# Patient Record
Sex: FEMALE | Race: WHITE | NOT HISPANIC OR LATINO | Employment: FULL TIME | URBAN - METROPOLITAN AREA
[De-identification: names, ages, dates, MRNs, and addresses within clinical notes are randomized per-mention and may not be internally consistent; named-entity substitution may affect disease eponyms.]

---

## 2017-03-16 ENCOUNTER — TELEPHONE (OUTPATIENT)
Dept: NEUROLOGY | Facility: CLINIC | Age: 22
End: 2017-03-16

## 2017-03-16 NOTE — TELEPHONE ENCOUNTER
----- Message from Oren Cole sent at 3/16/2017  1:57 PM CDT -----  Contact: Self [phone]  Caller is calling to schedule a NP to consult for MS and small vessel disease. pls call

## 2017-03-16 NOTE — TELEPHONE ENCOUNTER
Pt does not have MS. MRI of the [place] was abnormal. Advised she would need to see General Neurologist. Transferred patient to General Neurology to schedule.

## 2017-04-21 ENCOUNTER — OFFICE VISIT (OUTPATIENT)
Dept: NEUROLOGY | Facility: CLINIC | Age: 22
End: 2017-04-21
Payer: COMMERCIAL

## 2017-04-21 VITALS
BODY MASS INDEX: 24.06 KG/M2 | WEIGHT: 144.38 LBS | DIASTOLIC BLOOD PRESSURE: 71 MMHG | SYSTOLIC BLOOD PRESSURE: 101 MMHG | HEART RATE: 65 BPM | HEIGHT: 65 IN

## 2017-04-21 DIAGNOSIS — E22.9 PITUITARY MICROADENOMA WITH HYPERPROLACTINEMIA: Chronic | ICD-10-CM

## 2017-04-21 DIAGNOSIS — R93.89 ABNORMAL MRI: Primary | ICD-10-CM

## 2017-04-21 DIAGNOSIS — D35.2 PITUITARY MICROADENOMA WITH HYPERPROLACTINEMIA: Chronic | ICD-10-CM

## 2017-04-21 DIAGNOSIS — F41.9 ANXIETY: ICD-10-CM

## 2017-04-21 DIAGNOSIS — L65.9 HAIR LOSS: ICD-10-CM

## 2017-04-21 DIAGNOSIS — R41.3 MEMORY LOSS: ICD-10-CM

## 2017-04-21 DIAGNOSIS — R51.9 CHRONIC NONINTRACTABLE HEADACHE, UNSPECIFIED HEADACHE TYPE: ICD-10-CM

## 2017-04-21 DIAGNOSIS — H91.91 HEARING LOSS OF RIGHT EAR, UNSPECIFIED HEARING LOSS TYPE: Chronic | ICD-10-CM

## 2017-04-21 DIAGNOSIS — G89.29 CHRONIC NONINTRACTABLE HEADACHE, UNSPECIFIED HEADACHE TYPE: ICD-10-CM

## 2017-04-21 DIAGNOSIS — H73.90 ABNORMAL TYMPANIC MEMBRANE, UNSPECIFIED LATERALITY: ICD-10-CM

## 2017-04-21 PROBLEM — D68.2 FACTOR V DEFICIENCY: Chronic | Status: ACTIVE | Noted: 2017-04-21

## 2017-04-21 PROCEDURE — 99999 PR PBB SHADOW E&M-EST. PATIENT-LVL V: CPT | Mod: PBBFAC,,, | Performed by: PSYCHIATRY & NEUROLOGY

## 2017-04-21 PROCEDURE — 99203 OFFICE O/P NEW LOW 30 MIN: CPT | Mod: S$GLB,,, | Performed by: PSYCHIATRY & NEUROLOGY

## 2017-04-21 PROCEDURE — 1160F RVW MEDS BY RX/DR IN RCRD: CPT | Mod: S$GLB,,, | Performed by: PSYCHIATRY & NEUROLOGY

## 2017-04-21 NOTE — PROGRESS NOTES
Name: Jolly Whyte  MRN: [id number]   CSN: [id number]      Date: 04/21/2017    Referring physician:  Orlando Gastelum MD  [address]  [address]  [facility] [address]    Chief Complaint / Interval History: Abnormal MRI     History of Present Illness (HPI):  Accompanied by mom. Pt and mother state that she has an abnormal MRI - pt started loosing her hair, found to have a pituitary tumor. Pt has headaches and has had hearing loss in one ear also has memory trouble.     Headaches started about two years ago. Pressure like in nature - located in the temples and behind the eyes 8/10 on the pain scale, occur 4-5 times per month, last about an hour or two - she takes excedrin migraine which helps. NO nasusea or vomiting with them.  Do not wake her up at night. No warning sign she will get a  Headaches. Aunt has migraines. Gets photophobia and phonophobia. Has to stop her activities when they come on.     Hearing loss in one ear - also started about two years ago, right ear, happens all the time. Has not seen an ear doctor yet. No senstaion of ear fullness. No dizzziness. No ear pain.     Memorry trouble - cant remember things any more, harder to study for tests also in the last two years. Forgets the names of things sometimes, has some word finding difficulty. Pt drives, completes all ADLs independently     Hair loss also started about two years ago - sees Dr. [facility]elum [facility]. Falling out on top of her head. Sees scalp in her shower -she doesn't want to brush her hair.           Nonmotor/Premotor ROS:  Fevers, chills, weight loss - no  Hyposmia (HENT)?No  RBD/sleep issues (Constitutional)?No  Depression/anxiety (Psychiatric)?Yes - anxiety   Fatigue (Constitutional)?No  Constipation (GI)?No  Urinary issues ()?No  Sexual dysfunction ()?N/A  Orthostasis (Cardiovascular)?No  Leg swelling (Cardiovascular)? No  Falls (Musculoskeletal)?Yes - states she is clumsy  - runs into walls  "  Cognitive impairment (Neurologic)?Yes  Psychoses (Psychiatric)?No  Pain/Paresthesia (Neurologic)?No  Visual changes (Eyes)?No  Moles / skin changes (Skin)?No  Stridor / SOB (Pulm)?No  Bruising (Heme)?Yes    Past Medical History: The patient  has a past medical history of Chronic nonintractable headache (4/21/2017); Factor V deficiency (4/21/2017); and Pituitary microadenoma with hyperprolactinemia (5/7/2015).    Social History: The patient  lives with her mother and stepfather. Works as , occasional Et[employer] use.     Family History: Their family history includes Chiari malformation in her mother; Migraines in her maternal aunt; Myasthenia gravis in her mother.    Allergies: Review of patient's allergies indicates not on file.     Meds:   No current outpatient prescriptions on file prior to visit.     No current facility-administered medications on file prior to visit.      PRN Exedrin migraine     Exam:  /71 (BP Location: Left arm, Patient Position: Sitting, BP Method: Automatic)  Pulse 65  Ht 5' 5" (1.651 m)  Wt 65.5 kg (144 lb 6.4 oz)  BMI 24.03 kg/m2    Constitutional  Well-developed, well-nourished, appears stated age   Ophthalmoscopic  No papilledema with no hemorrhages or exudates bilaterally   HEENT  NC/AT, MMM, normal pharynx    Neurological    * Mental status      - Orientation  Oriented to person, place, time, and situation     - Memory   Intact recent and remote     - Attention/concentration  Attentive, vigilant during exam     - Language  Naming & repetition intact     - Fund of knowledge  Aware of current events     - Executive  Well-organized thoughts     - Other     * Cranial nerves       - CN II  PERRL, visual fields full to confrontation     - CN III, IV, VI  Extraocular movements full, normal pursuits and saccades     - CN V  Sensation V1 - V3 intact     - CN VII  Face strong and symmetric bilaterally     - CN VIII  Hearing intact bilaterally     - CN IX, X  Palate raises " midline and symmetric     - CN XI  SCM and trapezius 5/5 bilaterally     - CN XII  Tongue midline   * Motor  Muscle bulk normal, strength 5/5 throughout   * Sensory   Intact to temperature and vibration throughout   * Coordination  No dysmetria with finger-to-nose or heel-to-shin   * Gait  See below.   * Deep tendon reflexes  2+ and symmetric throughout   * Specialized movement exam  No hypophonic speech.    No facial masking.   No cogwheel rigidity.     No bradykinesia.   No tremor with rest, posture, kinesis, or intention.    No other dystonia, chorea, athetosis, myoclonus, or tics.     Normal-based gait.   No shortened stride length.   No abnormal arm swing.     Negative rhomberg      Laboratory/Radiological:    Prolacin level high in 2015: 52 ng/ml   - Results:No results found for any previous visit.    - Independent review of images: From outside discs, reviewed 3 MRIs one from 2015, 2016 and 2017.           Scattered punctate areas of hyperintensity on T2 and T2 flair - interpreted as areas enhanced perivascular space. Not concerning for demyelinating lesions. Not significantly progressed in size or number from scans dating back to 2015, 2016 which were also reviewed by me in office today.       Medical Decision Making:    Problem List Items Addressed This Visit        Neuro    Pituitary microadenoma with hyperprolactinemia (Chronic)    Overview     Stable size on MRIs of neck. Followed by outside endocrinologist but patient requests new endocrinologist [place] [facility] [place]          Current Assessment & Plan     [facility]          Relevant Orders    Ambulatory consult to Endocrinology    Chronic nonintractable headache    Overview     Migrainous, though no nausea or vomiting         Current Assessment & Plan     Offered preventative medication but pt prefers to stay on PRN Excedrin migraine for now         Abnormal tympanic membrane    Overview     May be calcifications, but patient also  complaining of right sided hearing loss         Current Assessment & Plan     - ENT and audiology referral          Relevant Orders    Ambulatory consult to ENT    Anxiety    Overview     Pt at times tearful during today's exam.         Current Assessment & Plan     - Counseled that she should see either a psychiatrist or a psychologist and she prefers seeing a psychiatrist to discuss options for medications.   - referred to psychiatry          Relevant Orders    Ambulatory consult to Psychiatry    Memory loss    Overview     Subjective.          Current Assessment & Plan     Offered neuropsychologic testing today which patient declined.     - Continue to monitor             Other    Hearing loss of right ear (Chronic)    Current Assessment & Plan     - ENT and audiology referral          Relevant Orders    Ambulatory consult to ENT    Abnormal MRI - Primary    Overview     Pituitary macroadenoma and pineal cyst - do not feel that punctate spots represent pathologic finding          Current Assessment & Plan     - reassured patient and her mother that findings are not presently concerning for multiple sclerosis.   - No further MRI's unless she has new or change in her neurologic symptoms as she has already had 3 MRIs in the last 2.5 years none with significant progression of punctate spots in question which I feel represent perivascular spaces           Hair loss    Overview     Followed by outside endocrinologist. Requests referral to [facility] endocrinology today          Relevant Orders    Ambulatory consult to Endocrinology            Return in about 6 months (around 10/21/2017).

## 2017-04-21 NOTE — MR AVS SNAPSHOT
"    [facility] - Neurology  [address]  [facility] [address]  Phone: [phone]  Fax: [phone]                  Jolly BOYER Pato   2017 8:00 AM   Office Visit    Description:  Female : 1995   Provider:  Victorino Pollock MD   Department:  [facility] - Neurology           Reason for Visit     Consult           Diagnoses this Visit        Comments    Abnormal MRI    -  Primary     Factor V deficiency         Chronic nonintractable headache, unspecified headache type         Pituitary microadenoma with hyperprolactinemia         Hearing loss of right ear, unspecified hearing loss type         Abnormal tympanic membrane, unspecified laterality         Hair loss         Anxiety                To Do List           Goals (5 Years of Data)     None      [facility]     [facility] On Call Nurse Care Line -  Assistance  Unless otherwise directed by your provider, please contact [facility], our nurse care line that is available for  assistance.     Registered nurses in the [facility] On Call Center provide: appointment scheduling, clinical advisement, health education, and other advisory services.  Call: [phone] (toll free)               Medications           Message regarding Medications     Verify the changes and/or additions to your medication regime listed below are the same as discussed with your clinician today.  If any of these changes or additions are incorrect, please notify your healthcare provider.             Verify that the below list of medications is an accurate representation of the medications you are currently taking.  If none reported, the list may be blank. If incorrect, please contact your healthcare provider. Carry this list with you in case of emergency.                Clinical Reference Information           Your Vitals Were     BP Pulse Height Weight BMI    101/71 (BP Location: Left arm, Patient Position: Sitting, BP Method: Automatic) 65 5' 5" (1.651 m) " 65.5 kg (144 lb 6.4 oz) 24.03 kg/m2      Blood Pressure          Most Recent Value    BP  101/71      Allergies as of 4/21/2017     No Known Allergies      Immunizations Administered on Date of Encounter - 4/21/2017     None      Orders Placed During Today's Visit      Normal Orders This Visit    Ambulatory consult to Endocrinology     Ambulatory consult to ENT     Ambulatory consult to Psychiatry       Instructions    1) Let me know if you would be interested in neuropsychologic testing in the future    2) Call ENT to get your initial consultation scheduled: [phone]    3) Someone from endocrinology and psychiatry will be giving you a call    4) Please come back and see us in 6 months to a year.         Language Assistance Services     ATTENTION: Language assistance services are available, free of charge. Please call [phone].      ATENCIÓN: Si valariela susi, tiene a vila disposición servicios gratuitos de asistencia lingüística. Llame al [phone].     CHÚ Ý: N?u b?n nói Ti?ng Vi?t, có các d?ch v? h? tr? ngôn ng? mi?n phí dành cho b?n. G?i s? [phone].         [facility] complies with applicable Federal civil rights laws and does not discriminate on the basis of race, color, national origin, age, disability, or sex.

## 2017-04-21 NOTE — ASSESSMENT & PLAN NOTE
- Counseled that she should see either a psychiatrist or a psychologist and she prefers seeing a psychiatrist to discuss options for medications.   - referred to psychiatry

## 2017-04-21 NOTE — PATIENT INSTRUCTIONS
1) Let me know if you would be interested in neuropsychologic testing in the future    2) Call ENT to get your initial consultation scheduled: [phone]    3) Someone from endocrinology and psychiatry will be giving you a call    4) Please come back and see us in 6 months to a year.

## 2017-04-21 NOTE — ASSESSMENT & PLAN NOTE
- reassured patient and her mother that findings are not presently concerning for multiple sclerosis.   - No further MRI's unless she has new or change in her neurologic symptoms as she has already had 3 MRIs in the last 2.5 years none with significant progression of punctate spots in question which I feel represent perivascular spaces

## 2017-04-21 NOTE — LETTER
April 21, 2017      Orlando Gastelum MD  [address]  [address]  [facility]  [facility] [address]           [facility]  [address]  [place] [address]  Phone: [phone]  Fax: [phone]          Patient: Jolly Whyte   MR Number: [id number]   YOB: 1995   Date of Visit: 4/21/2017       Dear Dr. Orlando Gastelum:    Thank you for referring Jolly Whyte to me for evaluation. Attached you will find relevant portions of my assessment and plan of care.    If you have questions, please do not hesitate to call me. I look forward to following Jolly Whyte along with you.    Sincerely,    Victorino Pollock MD    Enclosure  CC:  No Recipients    If you would like to receive this communication electronically, please contact [email or web address] or [phone] to request more information on [employer] Link access.    For providers and/or their staff who would like to refer a patient to [facility], please contact us through our one-stop-shop provider referral line, [facility], at [phone].    If you feel you have received this communication in error or would no longer like to receive these types of communications, please e-mail [email or web address]

## 2017-05-24 ENCOUNTER — CLINICAL SUPPORT (OUTPATIENT)
Dept: AUDIOLOGY | Facility: CLINIC | Age: 22
End: 2017-05-24
Payer: COMMERCIAL

## 2017-05-24 ENCOUNTER — INITIAL CONSULT (OUTPATIENT)
Dept: OTOLARYNGOLOGY | Facility: CLINIC | Age: 22
End: 2017-05-24
Payer: COMMERCIAL

## 2017-05-24 VITALS — HEIGHT: 65 IN | BODY MASS INDEX: 23.52 KG/M2 | WEIGHT: 141.13 LBS

## 2017-05-24 DIAGNOSIS — H80.91 OTOSCLEROSIS, RIGHT: Primary | ICD-10-CM

## 2017-05-24 DIAGNOSIS — H90.71 MIXED CONDUCTIVE AND SENSORINEURAL HEARING LOSS OF RIGHT EAR WITH UNRESTRICTED HEARING OF LEFT EAR: Primary | ICD-10-CM

## 2017-05-24 PROCEDURE — 92567 TYMPANOMETRY: CPT | Mod: S$GLB,,, | Performed by: AUDIOLOGIST

## 2017-05-24 PROCEDURE — 92557 COMPREHENSIVE HEARING TEST: CPT | Mod: S$GLB,,, | Performed by: AUDIOLOGIST

## 2017-05-24 PROCEDURE — 99999 PR PBB SHADOW E&M-EST. PATIENT-LVL I: CPT | Mod: PBBFAC,,,

## 2017-05-24 PROCEDURE — 99999 PR PBB SHADOW E&M-EST. PATIENT-LVL II: CPT | Mod: PBBFAC,,, | Performed by: OTOLARYNGOLOGY

## 2017-05-24 PROCEDURE — 99203 OFFICE O/P NEW LOW 30 MIN: CPT | Mod: S$GLB,,, | Performed by: OTOLARYNGOLOGY

## 2017-05-24 PROCEDURE — 1160F RVW MEDS BY RX/DR IN RCRD: CPT | Mod: S$GLB,,, | Performed by: OTOLARYNGOLOGY

## 2017-05-24 NOTE — PROGRESS NOTES
Subjective:       Patient ID: Jolly Wyhte is a 21 y.o. female.    Chief Complaint: Hearing Loss    20 yo F with 2 year hx of decreased hearing AD. No fam hx, no hx of ear infections or surgeries. No pain, vertigo. Min tinnitus on right      Hearing Loss:   Chronicity:  New  Onset:  More than 1 year ago  Progression since onset:  Gradually worsening  Frequency:  Constantly  Pain scale:  0/10  Severity:  Mild  Hearing loss characteristics:  Stable, mild and muffled   Associated symptoms: tinnitus.  No dizziness, no vertigo, no ear congestion, no ear pain, no fever, no headaches, no imbalance, no otalgia, no otorrhea and no facial weakness.  Aggravated by:  Nothing  Treatments tried:  NothingNo dizziness.    Review of Systems   Constitutional: Negative.  Negative for fever.   HENT: Positive for hearing loss and tinnitus. Negative for ear discharge and ear pain.    Eyes: Negative for visual disturbance.   Respiratory: Negative for shortness of breath.    Cardiovascular: Negative for chest pain.   Gastrointestinal: Negative.    Endocrine: Negative.    Genitourinary: Negative.    Musculoskeletal: Negative.    Skin: Negative.    Allergic/Immunologic: Negative.    Neurological: Negative for dizziness, vertigo and headaches.   Hematological: Negative.    Psychiatric/Behavioral: Negative.        Past Medical History:   Diagnosis Date    Chronic nonintractable headache 4/21/2017    Factor V deficiency 4/21/2017    Pituitary microadenoma with hyperprolactinemia 5/7/2015       No past surgical history on file.    Objective:      Physical Exam   Constitutional: She is oriented to person, place, and time. Vital signs are normal. She appears well-developed and well-nourished.  Non-toxic appearance. She does not have a sickly appearance. No distress.   HENT:   Head: Normocephalic and atraumatic.   Right Ear: External ear and ear canal normal. No tenderness. Tympanic membrane is not injected. Decreased hearing is noted.   Left  Ear: External ear and ear canal normal. No tenderness. Tympanic membrane is not injected. No decreased hearing is noted.   Ears:    Nose: No mucosal edema, rhinorrhea or septal deviation.   Eyes: EOM and lids are normal. Pupils are equal, round, and reactive to light.   Neck: Normal range of motion. No thyroid mass present.   Cardiovascular: Normal rate, regular rhythm and normal heart sounds.    Pulmonary/Chest: Effort normal and breath sounds normal.   Lymphadenopathy:     She has no cervical adenopathy.   Neurological: She is alert and oriented to person, place, and time.   Skin: Skin is warm and dry.   Nursing note and vitals reviewed.              Rinne neg AD with 512 hz.    Douglas to DARRELL      Assessment:       1. Otosclerosis, right             Vs SSC abnl.  Plan:       Jolly was seen today for hearing loss.    Diagnoses and all orders for this visit:    Otosclerosis, right  -     Ambulatory referral to Audiology    Discussed Right small fenestra stapedotomy with patient. Also reviewed all other options including observation and amplification. Risks, complications, benefits and goals reviewed. This includes: failure to improve, total loss of hearing, tinnitus, dizziness, chorda symptoms, infection, bleeding, need for revision and other potential complications. Will proceed at the patients convinience a general outpatient procedure after appropriate clearances.    Patient to see Audiology for hearing aid evaluation.    Needs pre op CT of T bones.

## 2017-05-24 NOTE — LETTER
May 24, 2017      Victorino Pollock MD  [address]  [facility] [address]           [facility] - Otorhinolaryngology  [address]  [facility] [address]  Phone: [phone]  Fax: [phone]          Patient: Jolly Whyte   MR Number: [id number]   YOB: 1995   Date of Visit: 5/24/2017       Dear Dr. Victorino Pollock:    Thank you for referring Jolly Whyte to me for evaluation. Attached you will find relevant portions of my assessment and plan of care.    If you have questions, please do not hesitate to call me. I look forward to following Jolly Whyte along with you.    Sincerely,    Angel Pierre MD    Enclosure  CC:  No Recipients    If you would like to receive this communication electronically, please contact [email or web address] or [phone] to request more information on [employer] Link access.    For providers and/or their staff who would like to refer a patient to [facility], please contact us through our one-stop-shop provider referral line, [facility], at [phone].    If you feel you have received this communication in error or would no longer like to receive these types of communications, please e-mail [email or web address]

## 2017-08-07 ENCOUNTER — OFFICE VISIT (OUTPATIENT)
Dept: ENDOCRINOLOGY | Facility: CLINIC | Age: 22
End: 2017-08-07
Payer: COMMERCIAL

## 2017-08-07 VITALS
HEIGHT: 65 IN | SYSTOLIC BLOOD PRESSURE: 114 MMHG | RESPIRATION RATE: 16 BRPM | BODY MASS INDEX: 22.7 KG/M2 | HEART RATE: 60 BPM | WEIGHT: 136.25 LBS | DIASTOLIC BLOOD PRESSURE: 73 MMHG

## 2017-08-07 DIAGNOSIS — E22.9 PITUITARY MICROADENOMA WITH HYPERPROLACTINEMIA: Primary | Chronic | ICD-10-CM

## 2017-08-07 DIAGNOSIS — D35.2 PITUITARY MICROADENOMA WITH HYPERPROLACTINEMIA: Primary | Chronic | ICD-10-CM

## 2017-08-07 DIAGNOSIS — L65.9 HAIR LOSS: ICD-10-CM

## 2017-08-07 DIAGNOSIS — R93.89 ABNORMAL MRI: ICD-10-CM

## 2017-08-07 DIAGNOSIS — E22.1 HYPERPROLACTINEMIA: ICD-10-CM

## 2017-08-07 PROCEDURE — 99204 OFFICE O/P NEW MOD 45 MIN: CPT | Mod: S$GLB,,, | Performed by: INTERNAL MEDICINE

## 2017-08-07 PROCEDURE — 3008F BODY MASS INDEX DOCD: CPT | Mod: S$GLB,,, | Performed by: INTERNAL MEDICINE

## 2017-08-07 PROCEDURE — 99999 PR PBB SHADOW E&M-EST. PATIENT-LVL III: CPT | Mod: PBBFAC,,, | Performed by: INTERNAL MEDICINE

## 2017-08-07 NOTE — LETTER
August 7, 2017      Victorino Pollock MD  [address]  [facility] [address]           [facility]  [address]  [facility] [address]  Phone: [phone]  Fax: [phone]          Patient: Jolly Whyte   MR Number: [id number]   YOB: 1995   Date of Visit: 8/7/2017       Dear Dr. Victorino Pollock:    Thank you for referring Jolly Whyte to me for evaluation. Attached you will find relevant portions of my assessment and plan of care.    If you have questions, please do not hesitate to call me. I look forward to following Jolly Whyte along with you.    Sincerely,    Heriberto Isbell MD    Enclosure  CC:  No Recipients    If you would like to receive this communication electronically, please contact [email or web address] or [phone] to request more information on [employer] Link access.    For providers and/or their staff who would like to refer a patient to [facility], please contact us through our one-stop-shop provider referral line, [facility], at [phone].    If you feel you have received this communication in error or would no longer like to receive these types of communications, please e-mail [email or web address]

## 2017-08-07 NOTE — PROGRESS NOTES
Subjective:      Patient ID: Jolly Whyte is a 22 y.o. female.    Chief Complaint:  Pituitary microadenoma      History of Present Illness    MsJunior Whyte is here with her mother   She was having hair loss in 2015 found to have hyperprolactinemia at the time for work up for hair loss   Menarche at age 10   Regular menses     She is not pregnant per her   No galactorrhea or breast tenderness   no h/o kidney or thyroid disease     No h/o eating disorder    She eats ~ 1400 rell/day     Never had chest trauma      PSH:  H/o knee surgery and tonsillectomy     Review of Systems   Eyes: Negative for visual disturbance.   Cardiovascular: Negative for chest pain and palpitations.   Gastrointestinal: Negative for constipation, diarrhea and nausea.   Genitourinary: Negative for menstrual problem.   Neurological: Positive for headaches.   Psychiatric/Behavioral: Negative for sleep disturbance. The patient is nervous/anxious.        Objective:   Physical Exam   Constitutional: She appears well-developed.   HENT:   Right Ear: External ear normal.   Left Ear: External ear normal.   Nose: Nose normal.   Neck: No tracheal deviation present. No thyromegaly present.   Cardiovascular: Normal rate.    No murmur heard.  Pulmonary/Chest: Effort normal and breath sounds normal.       Abdominal: Soft. There is no tenderness. No hernia.   Musculoskeletal: She exhibits no edema.   Neurological: She displays normal reflexes. No cranial nerve deficit.   Skin: No rash noted.          Psychiatric: She has a normal mood and affect. Judgment normal.   Vitals reviewed.      Lab Review:   Outside sent for scan     MRI report 4 x 2 mm pituitary microadenoma     Prolactin 52 from 2015     Assessment:     1. Pituitary microadenoma with hyperprolactinemia  TSH    Comprehensive metabolic panel    Prolactin    TSH    Prolactin    Insulin-like growth factor    Insulin-like growth factor    Comprehensive metabolic panel    Prolactin    TSH     Insulin-like growth factor    Insulin-like growth factor   2. Abnormal MRI  TSH    Comprehensive metabolic panel    Prolactin    TSH    Prolactin    Insulin-like growth factor    Insulin-like growth factor    Comprehensive metabolic panel    Prolactin    TSH    Insulin-like growth factor    Insulin-like growth factor   3. Hair loss  TSH    Comprehensive metabolic panel    Prolactin    TSH    Prolactin    Insulin-like growth factor    Insulin-like growth factor    Comprehensive metabolic panel    Prolactin    TSH    Insulin-like growth factor    Insulin-like growth factor   4. Hyperprolactinemia  TSH    Comprehensive metabolic panel    Prolactin    TSH    Prolactin    Insulin-like growth factor    Insulin-like growth factor    Comprehensive metabolic panel    Prolactin    TSH    Insulin-like growth factor    Insulin-like growth factor        # hyperprolactinemia with microadenoma     Reviewed indications for therapy including macroadenoma, galactorrhea, infertility and hypogonadism which can negatively impact bone health.    No indication for treatment     Prolactin, IGF-1, TSH, CMP today      Hand out provided       # hair loss   Work up already done   Check tSH            Plan:     Follow up:  Prolactin, IGF-1, TSH, CMP  today

## 2017-08-10 LAB
ALBUMIN SERPL-MCNC: 4.9 G/DL (ref 3.6–5.1)
ALBUMIN/GLOB SERPL: 2.1 (CALC) (ref 1–2.5)
ALP SERPL-CCNC: 57 U/L (ref 33–115)
ALT SERPL-CCNC: 13 U/L (ref 6–29)
AST SERPL-CCNC: 16 U/L (ref 10–30)
BILIRUB SERPL-MCNC: 0.4 MG/DL (ref 0.2–1.2)
BUN SERPL-MCNC: 10 MG/DL (ref 7–25)
BUN/CREAT SERPL: NORMAL (CALC) (ref 6–22)
CALCIUM SERPL-MCNC: 9.6 MG/DL (ref 8.6–10.2)
CHLORIDE SERPL-SCNC: 106 MMOL/L (ref 98–110)
CO2 SERPL-SCNC: 27 MMOL/L (ref 20–31)
CREAT SERPL-MCNC: 0.64 MG/DL (ref 0.5–1.1)
GFR SERPL CREATININE-BSD FRML MDRD: 128 ML/MIN/1.73M2
GLOBULIN SER CALC-MCNC: 2.3 G/DL (CALC) (ref 1.9–3.7)
GLUCOSE SERPL-MCNC: 76 MG/DL (ref 65–99)
IGF-I SERPL-MCNC: 162 NG/ML (ref 83–456)
IGF-I Z-SCORE SERPL: -1 SD
POTASSIUM SERPL-SCNC: 4.5 MMOL/L (ref 3.5–5.3)
PROLACTIN SERPL-MCNC: 11.6 NG/ML
PROT SERPL-MCNC: 7.2 G/DL (ref 6.1–8.1)
SODIUM SERPL-SCNC: 140 MMOL/L (ref 135–146)
TSH SERPL-ACNC: 2.31 MIU/L

## 2017-12-01 ENCOUNTER — OFFICE VISIT (OUTPATIENT)
Dept: URGENT CARE | Facility: CLINIC | Age: 22
End: 2017-12-01
Payer: COMMERCIAL

## 2017-12-01 VITALS
WEIGHT: 136 LBS | BODY MASS INDEX: 22.66 KG/M2 | RESPIRATION RATE: 18 BRPM | HEART RATE: 61 BPM | DIASTOLIC BLOOD PRESSURE: 70 MMHG | HEIGHT: 65 IN | SYSTOLIC BLOOD PRESSURE: 107 MMHG | OXYGEN SATURATION: 99 % | TEMPERATURE: 97 F

## 2017-12-01 DIAGNOSIS — J10.1 INFLUENZA A: Primary | ICD-10-CM

## 2017-12-01 DIAGNOSIS — R11.0 NAUSEA: ICD-10-CM

## 2017-12-01 LAB
CTP QC/QA: YES
FLUAV AG NPH QL: POSITIVE
FLUBV AG NPH QL: NEGATIVE

## 2017-12-01 PROCEDURE — 99213 OFFICE O/P EST LOW 20 MIN: CPT | Mod: 25,S$GLB,, | Performed by: INTERNAL MEDICINE

## 2017-12-01 PROCEDURE — 87804 INFLUENZA ASSAY W/OPTIC: CPT | Mod: QW,S$GLB,, | Performed by: INTERNAL MEDICINE

## 2017-12-01 RX ORDER — OSELTAMIVIR PHOSPHATE 75 MG/1
75 CAPSULE ORAL 2 TIMES DAILY
Qty: 10 CAPSULE | Refills: 0 | Status: SHIPPED | OUTPATIENT
Start: 2017-12-01 | End: 2017-12-06

## 2017-12-01 NOTE — PROGRESS NOTES
"Subjective:       Patient ID: Jolly Whyte is a 22 y.o. female.    Vitals:  height is 5' 5" (1.651 m) and weight is 61.7 kg (136 lb). Her oral temperature is 97.4 °F (36.3 °C). Her blood pressure is 107/70 and her pulse is 61. Her respiration is 18 and oxygen saturation is 99%.     Chief Complaint: Fever    Pt states for the last 2 days she has had chills and fever of 101 and today the nausea started.      Nausea   This is a new problem. The current episode started in the past 7 days. The problem occurs constantly. The problem has been gradually worsening. Associated symptoms include chills, a fever, nausea and vomiting. Pertinent negatives include no abdominal pain, chest pain, headaches, rash or sore throat. Exacerbated by: always feeling nauseated  Treatments tried: dayquil. The treatment provided no relief.   Patient is a pre-school assistant.  She has 2 day history of fever, cough and nausea.    Review of Systems   Constitution: Positive for chills and fever.   HENT: Negative for sore throat.    Eyes: Negative for blurred vision.   Cardiovascular: Negative for chest pain.   Respiratory: Negative for shortness of breath.    Skin: Negative for rash.   Musculoskeletal: Negative for back pain and joint pain.   Gastrointestinal: Positive for diarrhea, nausea and vomiting. Negative for abdominal pain.   Neurological: Negative for headaches.   Psychiatric/Behavioral: The patient is not nervous/anxious.        Objective:      Physical Exam   Constitutional: She appears well-developed and well-nourished. No distress.   HENT:   Mouth/Throat: Oropharynx is clear and moist. No oropharyngeal exudate.   Neck: Neck supple.   Cardiovascular: Normal rate and regular rhythm.    Pulmonary/Chest: Effort normal and breath sounds normal. No respiratory distress. She has no wheezes. She has no rales.   Lymphadenopathy:     She has no cervical adenopathy.   Nursing note and vitals reviewed.    Flu NP test positive  Assessment:     " 1.  Influenza A    Plan:       1.  Tamiflu 75 mg BID  2.  Advil and fluids

## 2017-12-02 NOTE — PATIENT INSTRUCTIONS

## 2018-08-06 ENCOUNTER — OFFICE VISIT (OUTPATIENT)
Dept: INTERNAL MEDICINE | Facility: CLINIC | Age: 23
End: 2018-08-06
Payer: COMMERCIAL

## 2018-08-06 VITALS
OXYGEN SATURATION: 99 % | DIASTOLIC BLOOD PRESSURE: 74 MMHG | BODY MASS INDEX: 22.56 KG/M2 | WEIGHT: 135.38 LBS | SYSTOLIC BLOOD PRESSURE: 102 MMHG | HEIGHT: 65 IN | HEART RATE: 60 BPM

## 2018-08-06 DIAGNOSIS — Z11.1 SCREENING-PULMONARY TB: ICD-10-CM

## 2018-08-06 DIAGNOSIS — Z01.84 IMMUNITY STATUS TESTING: ICD-10-CM

## 2018-08-06 DIAGNOSIS — Z13.220 SCREENING CHOLESTEROL LEVEL: ICD-10-CM

## 2018-08-06 DIAGNOSIS — Z01.89 ROUTINE LAB DRAW: ICD-10-CM

## 2018-08-06 DIAGNOSIS — Z00.00 ENCOUNTER FOR HEALTH MAINTENANCE EXAMINATION IN ADULT: Primary | ICD-10-CM

## 2018-08-06 PROCEDURE — 99999 PR PBB SHADOW E&M-EST. PATIENT-LVL IV: CPT | Mod: PBBFAC,,, | Performed by: NURSE PRACTITIONER

## 2018-08-06 PROCEDURE — 99385 PREV VISIT NEW AGE 18-39: CPT | Mod: S$GLB,,, | Performed by: NURSE PRACTITIONER

## 2018-08-06 PROCEDURE — 86580 TB INTRADERMAL TEST: CPT | Mod: S$GLB,,, | Performed by: NURSE PRACTITIONER

## 2018-08-06 NOTE — PROGRESS NOTES
Subjective:       Patient ID: Jolly Whyte is a 23 y.o. female.    Chief Complaint: Annual Exam    Pt new to me, presents for annual with labs for Zhu. Previous PCP was her pediatrician.    Was seen last year by Endocrine for Pituitary microadenoma with hyperprolactinemia. Last seen 8-7-2017 by Endocrine.       Review of Systems   Constitutional: Negative for activity change, appetite change and unexpected weight change.   HENT: Negative for dental problem and hearing loss.    Eyes: Negative for visual disturbance.   Respiratory: Negative for apnea, cough, chest tightness and shortness of breath.    Cardiovascular: Negative for chest pain, palpitations and leg swelling.   Gastrointestinal: Negative for abdominal distention, abdominal pain, anal bleeding, blood in stool, constipation, diarrhea, nausea, rectal pain and vomiting.   Endocrine: Negative for cold intolerance, heat intolerance, polydipsia, polyphagia and polyuria.   Genitourinary: Negative for difficulty urinating, hematuria, menstrual problem, pelvic pain and vaginal pain.   Musculoskeletal: Negative for arthralgias.   Skin: Negative for color change.   Allergic/Immunologic: Negative for environmental allergies, food allergies and immunocompromised state.   Neurological: Negative for dizziness, speech difficulty and weakness.   Hematological: Negative for adenopathy. Does not bruise/bleed easily.   Psychiatric/Behavioral: Negative for agitation, behavioral problems, sleep disturbance and suicidal ideas.       Review of patient's allergies indicates:  No Known Allergies    No current outpatient prescriptions on file.     Patient Active Problem List   Diagnosis    Factor V deficiency    Chronic nonintractable headache    Pituitary microadenoma with hyperprolactinemia    Hearing loss of right ear    Abnormal tympanic membrane    Abnormal MRI    Hair loss    Anxiety    Memory loss     Past Medical History:   Diagnosis Date    Chronic  "nonintractable headache 4/21/2017    Factor V deficiency 4/21/2017    Pituitary microadenoma with hyperprolactinemia 5/7/2015     Past Surgical History:   Procedure Laterality Date    left knee debridement Left     age 7     Social History     Social History    Marital status: Single     Spouse name: N/A    Number of children: N/A    Years of education: N/A     Occupational History          Social History Main Topics    Smoking status: Never Smoker    Smokeless tobacco: Current User    Alcohol use No    Drug use: No    Sexual activity: Yes     Other Topics Concern    None     Social History Narrative    None     Family History   Problem Relation Age of Onset    Chiari malformation Mother     Myasthenia gravis Mother     Migraines Maternal Aunt        Objective:       Vitals:    08/06/18 0800   BP: 102/74   Pulse: 60   SpO2: 99%   Weight: 61.4 kg (135 lb 5.8 oz)   Height: 5' 5" (1.651 m)   PainSc: 0-No pain     Body mass index is 22.53 kg/m².    Physical Exam   Constitutional: She is oriented to person, place, and time. Vital signs are normal. She appears well-developed and well-nourished.   HENT:   Head: Normocephalic.   Right Ear: Hearing, tympanic membrane, external ear and ear canal normal.   Left Ear: Hearing, tympanic membrane, external ear and ear canal normal.   Eyes: Conjunctivae, EOM and lids are normal. Pupils are equal, round, and reactive to light. Lids are everted and swept, no foreign bodies found.   Neck: Trachea normal, normal range of motion and full passive range of motion without pain. Neck supple. No JVD present. Carotid bruit is not present.   Cardiovascular: Normal rate, regular rhythm, S1 normal, S2 normal, normal heart sounds, intact distal pulses and normal pulses.    Pulmonary/Chest: Effort normal and breath sounds normal.   Abdominal: Soft. Normal appearance and bowel sounds are normal. There is no hepatosplenomegaly.   Musculoskeletal: Normal range of " motion.   Neurological: She is alert and oriented to person, place, and time. She has normal strength and normal reflexes.   Skin: Skin is warm, dry and intact. Capillary refill takes less than 2 seconds.   Psychiatric: She has a normal mood and affect. Her speech is normal and behavior is normal. Judgment and thought content normal. Cognition and memory are normal.   Vitals reviewed.      Assessment:       1. Encounter for health maintenance examination in adult    2. Screening cholesterol level    3. Routine lab draw    4. Immunity status testing    5. Screening-pulmonary TB    6. BMI 22.0-22.9, adult        Plan:     Jolly was seen today for annual exam.    Diagnoses and all orders for this visit:    Encounter for health maintenance examination in adult  Annual wellness exam completed.    All medications, histories, and concerns reviewed, reconciled, and addressed.    Appropriate Screenings per pt's sex and age have been reviewed and discussed with pt.    BMI reviewed.    Screening cholesterol level  -     Lipid panel; Future    Routine lab draw  -     CBC auto differential; Future  -     Comprehensive metabolic panel; Future  -     Lipid panel; Future  -     CBC auto differential  -     Comprehensive metabolic panel  Immunity status testing  -     Varicella zoster antibody, IgG; Future  -     Mumps, IgG Screen; Future  -     Rubeola antibody IgG; Future  -     Hepatitis B surface antibody; Future        -     Rubella antibody IgG; Future  Screening-pulmonary TB  -     POCT TB Skin Test Read    BMI 22.0-22.9, adult  BMI reviewed    Will need to get Influenza vaccine at retail pharmacy who has available now    PPD test today, RTC in 48-72 hrs for reading    Titers drawn today, will call with results

## 2018-08-06 NOTE — PATIENT INSTRUCTIONS
Will need to get Influenza vaccine at retail pharmacy who has available now    PPD test today, RTC in 48-72 hrs for reading    Titers drawn today, will call with results

## 2018-08-08 ENCOUNTER — PATIENT MESSAGE (OUTPATIENT)
Dept: INTERNAL MEDICINE | Facility: CLINIC | Age: 23
End: 2018-08-08

## 2018-08-08 ENCOUNTER — CLINICAL SUPPORT (OUTPATIENT)
Dept: INTERNAL MEDICINE | Facility: CLINIC | Age: 23
End: 2018-08-08
Payer: COMMERCIAL

## 2018-08-08 ENCOUNTER — TELEPHONE (OUTPATIENT)
Dept: INTERNAL MEDICINE | Facility: CLINIC | Age: 23
End: 2018-08-08

## 2018-08-08 DIAGNOSIS — Z23 NEED FOR HEPATITIS B VACCINATION: Primary | ICD-10-CM

## 2018-08-08 DIAGNOSIS — Z01.84 IMMUNITY STATUS TESTING: Primary | ICD-10-CM

## 2018-08-08 NOTE — TELEPHONE ENCOUNTER
----- Message from Carson Ortiz sent at 8/8/2018 10:25 AM CDT -----  Contact: [phone]  Patient requesting a call from the office in regards to her visit she just had . Please advise  Thanks

## 2018-08-08 NOTE — TELEPHONE ENCOUNTER
Spoke with patient she is having trouble getting results from [facility]. Ms Jackson called and requested results to be faxed to us.

## 2018-08-09 LAB
ALBUMIN SERPL-MCNC: 4.4 G/DL (ref 3.6–5.1)
ALBUMIN/GLOB SERPL: 2.1 (CALC) (ref 1–2.5)
ALP SERPL-CCNC: 57 U/L (ref 33–115)
ALT SERPL-CCNC: 15 U/L (ref 6–29)
AST SERPL-CCNC: 14 U/L (ref 10–30)
BASOPHILS # BLD AUTO: 37 CELLS/UL (ref 0–200)
BASOPHILS NFR BLD AUTO: 0.6 %
BILIRUB SERPL-MCNC: 0.5 MG/DL (ref 0.2–1.2)
BUN SERPL-MCNC: 11 MG/DL (ref 7–25)
BUN/CREAT SERPL: NORMAL (CALC) (ref 6–22)
CALCIUM SERPL-MCNC: 9.6 MG/DL (ref 8.6–10.2)
CHLORIDE SERPL-SCNC: 105 MMOL/L (ref 98–110)
CHOLEST SERPL-MCNC: 184 MG/DL
CHOLEST/HDLC SERPL: 3.6 (CALC)
CLIENT CONTACT:: NORMAL
CO2 SERPL-SCNC: 27 MMOL/L (ref 20–32)
COMMENT: NORMAL
CREAT SERPL-MCNC: 0.59 MG/DL (ref 0.5–1.1)
EOSINOPHIL # BLD AUTO: 93 CELLS/UL (ref 15–500)
EOSINOPHIL NFR BLD AUTO: 1.5 %
ERYTHROCYTE [DISTWIDTH] IN BLOOD BY AUTOMATED COUNT: 12.3 % (ref 11–15)
GFR SERPL CREATININE-BSD FRML MDRD: 131 ML/MIN/1.73M2
GLOBULIN SER CALC-MCNC: 2.1 G/DL (CALC) (ref 1.9–3.7)
GLUCOSE SERPL-MCNC: 85 MG/DL (ref 65–139)
HBV SURFACE AB SER QL IA: NORMAL
HCT VFR BLD AUTO: 40.6 % (ref 35–45)
HDLC SERPL-MCNC: 51 MG/DL
HGB BLD-MCNC: 13.3 G/DL (ref 11.7–15.5)
LDLC SERPL CALC-MCNC: 117 MG/DL (CALC)
LYMPHOCYTES # BLD AUTO: 1823 CELLS/UL (ref 850–3900)
LYMPHOCYTES NFR BLD AUTO: 29.4 %
Lab: NORMAL
MCH RBC QN AUTO: 30.6 PG (ref 27–33)
MCHC RBC AUTO-ENTMCNC: 32.8 G/DL (ref 32–36)
MCV RBC AUTO: 93.5 FL (ref 80–100)
MEV IGG SER IA-ACNC: 163 AU/ML
MONOCYTES # BLD AUTO: 391 CELLS/UL (ref 200–950)
MONOCYTES NFR BLD AUTO: 6.3 %
MUV IGG SER IA-ACNC: 54.7 AU/ML
NEUTROPHILS # BLD AUTO: 3856 CELLS/UL (ref 1500–7800)
NEUTROPHILS NFR BLD AUTO: 62.2 %
NONHDLC SERPL-MCNC: 133 MG/DL (CALC)
PLATELET # BLD AUTO: 271 THOUSAND/UL (ref 140–400)
PMV BLD REES-ECKER: 11.5 FL (ref 7.5–12.5)
POTASSIUM SERPL-SCNC: 3.8 MMOL/L (ref 3.5–5.3)
PROT SERPL-MCNC: 6.5 G/DL (ref 6.1–8.1)
RBC # BLD AUTO: 4.34 MILLION/UL (ref 3.8–5.1)
REF LAB TEST NAME: NORMAL
REF LAB TEST: NORMAL
RUBV IGG SERPL IA-ACNC: 4.9 INDEX
SODIUM SERPL-SCNC: 140 MMOL/L (ref 135–146)
TRIGL SERPL-MCNC: 70 MG/DL
VZV IGG SER IA-ACNC: 1234 INDEX
WBC # BLD AUTO: 6.2 THOUSAND/UL (ref 3.8–10.8)

## 2018-08-10 ENCOUNTER — CLINICAL SUPPORT (OUTPATIENT)
Dept: INFECTIOUS DISEASES | Facility: CLINIC | Age: 23
End: 2018-08-10
Payer: COMMERCIAL

## 2018-08-10 PROCEDURE — 90746 HEPB VACCINE 3 DOSE ADULT IM: CPT | Mod: S$GLB,,, | Performed by: INTERNAL MEDICINE

## 2018-08-10 PROCEDURE — 90471 IMMUNIZATION ADMIN: CPT | Mod: S$GLB,,, | Performed by: INTERNAL MEDICINE

## 2018-08-10 NOTE — PROGRESS NOTES
Pt received the Hepatitis B vaccination. Pt tolerated the injection well. Pt left the unit in NAD. Immunization report printed per pt request.

## 2018-10-16 ENCOUNTER — TELEPHONE (OUTPATIENT)
Dept: INTERNAL MEDICINE | Facility: CLINIC | Age: 23
End: 2018-10-16

## 2018-10-16 DIAGNOSIS — Z23 NEED FOR HEPATITIS B VACCINATION: Primary | ICD-10-CM

## 2018-10-16 NOTE — TELEPHONE ENCOUNTER
----- Message from Lotus Linder sent at 10/16/2018  1:26 PM CDT -----  Contact: Pt Mobile [phone]  Patient would like to put in an order to have a Hepatitis B shot.

## 2018-10-24 ENCOUNTER — CLINICAL SUPPORT (OUTPATIENT)
Dept: INFECTIOUS DISEASES | Facility: CLINIC | Age: 23
End: 2018-10-24
Payer: COMMERCIAL

## 2018-10-24 PROCEDURE — 90746 HEPB VACCINE 3 DOSE ADULT IM: CPT | Mod: S$GLB,,, | Performed by: INTERNAL MEDICINE

## 2018-10-24 PROCEDURE — 90471 IMMUNIZATION ADMIN: CPT | Mod: S$GLB,,, | Performed by: INTERNAL MEDICINE

## 2019-02-12 ENCOUNTER — CLINICAL SUPPORT (OUTPATIENT)
Dept: INFECTIOUS DISEASES | Facility: CLINIC | Age: 24
End: 2019-02-12
Payer: COMMERCIAL

## 2019-02-12 PROCEDURE — 90471 IMMUNIZATION ADMIN: CPT | Mod: S$GLB,,, | Performed by: INTERNAL MEDICINE

## 2019-02-12 PROCEDURE — 90471 HEPATITIS B VACCINE ADULT IM: ICD-10-PCS | Mod: S$GLB,,, | Performed by: INTERNAL MEDICINE

## 2019-02-12 PROCEDURE — 90746 HEPB VACCINE 3 DOSE ADULT IM: CPT | Mod: S$GLB,,, | Performed by: INTERNAL MEDICINE

## 2019-02-12 PROCEDURE — 90746 HEPATITIS B VACCINE ADULT IM: ICD-10-PCS | Mod: S$GLB,,, | Performed by: INTERNAL MEDICINE

## 2019-09-05 ENCOUNTER — CLINICAL SUPPORT (OUTPATIENT)
Dept: URGENT CARE | Facility: CLINIC | Age: 24
End: 2019-09-05
Payer: COMMERCIAL

## 2019-09-05 DIAGNOSIS — Z00.00 PHYSICAL EXAM: Primary | ICD-10-CM

## 2019-09-05 PROCEDURE — 86580 POCT TB SKIN TEST: ICD-10-PCS | Mod: S$GLB,,, | Performed by: NURSE PRACTITIONER

## 2019-09-05 PROCEDURE — 86580 TB INTRADERMAL TEST: CPT | Mod: S$GLB,,, | Performed by: NURSE PRACTITIONER

## 2019-09-08 LAB
TB INDURATION - 48 HR READ: NORMAL MM
TB INDURATION - 72 HR READ: 0 MM
TB SKIN TEST - 48 HR READ: NORMAL
TB SKIN TEST - 72 HR READ: NEGATIVE

## 2019-11-21 ENCOUNTER — OFFICE VISIT (OUTPATIENT)
Dept: INTERNAL MEDICINE | Facility: CLINIC | Age: 24
End: 2019-11-21
Payer: COMMERCIAL

## 2019-11-21 VITALS
WEIGHT: 171.94 LBS | SYSTOLIC BLOOD PRESSURE: 126 MMHG | BODY MASS INDEX: 28.65 KG/M2 | OXYGEN SATURATION: 99 % | DIASTOLIC BLOOD PRESSURE: 80 MMHG | HEART RATE: 74 BPM | HEIGHT: 65 IN

## 2019-11-21 DIAGNOSIS — E22.9 PITUITARY MICROADENOMA WITH HYPERPROLACTINEMIA: ICD-10-CM

## 2019-11-21 DIAGNOSIS — E03.9 HYPOTHYROIDISM, UNSPECIFIED TYPE: ICD-10-CM

## 2019-11-21 DIAGNOSIS — D35.2 PITUITARY MICROADENOMA WITH HYPERPROLACTINEMIA: ICD-10-CM

## 2019-11-21 DIAGNOSIS — Z00.00 ENCOUNTER FOR SCREENING AND PREVENTATIVE CARE: Primary | ICD-10-CM

## 2019-11-21 PROCEDURE — 99395 PR PREVENTIVE VISIT,EST,18-39: ICD-10-PCS | Mod: S$GLB,,, | Performed by: STUDENT IN AN ORGANIZED HEALTH CARE EDUCATION/TRAINING PROGRAM

## 2019-11-21 PROCEDURE — 99999 PR PBB SHADOW E&M-EST. PATIENT-LVL III: CPT | Mod: PBBFAC,,, | Performed by: STUDENT IN AN ORGANIZED HEALTH CARE EDUCATION/TRAINING PROGRAM

## 2019-11-21 PROCEDURE — 99999 PR PBB SHADOW E&M-EST. PATIENT-LVL III: ICD-10-PCS | Mod: PBBFAC,,, | Performed by: STUDENT IN AN ORGANIZED HEALTH CARE EDUCATION/TRAINING PROGRAM

## 2019-11-21 PROCEDURE — 99395 PREV VISIT EST AGE 18-39: CPT | Mod: S$GLB,,, | Performed by: STUDENT IN AN ORGANIZED HEALTH CARE EDUCATION/TRAINING PROGRAM

## 2019-11-21 NOTE — PROGRESS NOTES
Clinic Note  11/21/2019      Subjective:       Patient ID:  Jolly is a 24 y.o. female being seen for an established care visit.    Chief Complaint: Annual Exam and Establish Care    HPI  Ms. 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 The patient presents today at  clinic for routine annual physical. She has no complaints and would like to do annual labs. Patient is all up to date on her vaccinations. ROS negative aside from hearing loss in R ear and HA that she experience once a week in her temples that lasts about 1 hour and subsides with Excedrin use. The patient reports she has had headaches like this for years and they have not changed in character. Her last MRI of her brain was in 4/2017 showing a stable 1.4 X 2 mm pituitary microadenoma with a prolactin level of 11.6. The patient reports having a follow up appointment with Neurology in Jan 2020 for a repeat MRI to monitor for any changes. In addition to routine preventive care, the patient had a pap smear done in June of 2019 showing ASCUS and negative for HPV with repeat pap smear in December 2019.      Review of Systems   Constitutional: Negative for chills, diaphoresis, fever, malaise/fatigue and weight loss.   HENT: Positive for hearing loss. Negative for ear pain and sore throat.    Eyes: Negative for blurred vision and double vision.   Respiratory: Negative for cough, sputum production, shortness of breath and stridor.    Cardiovascular: Negative for chest pain, palpitations and leg swelling.   Gastrointestinal: Negative for abdominal pain, heartburn and nausea.   Genitourinary: Negative for dysuria and urgency.   Musculoskeletal: Negative for back pain, myalgias and neck pain.   Neurological: Negative for dizziness, tremors, weakness and headaches.  "  Psychiatric/Behavioral: Negative for depression. The patient is not nervous/anxious.        Medication List with Changes/Refills   Current Medications    AFLURIA QUAD [id number], PF, 60 MCG/0.5 ML VACCINE    TO BE ADMINISTERED BY PHARMACIST FOR IMMUNIZATION       Patient Active Problem List   Diagnosis    Factor V deficiency    Chronic nonintractable headache    Pituitary microadenoma with hyperprolactinemia    Hearing loss of right ear    Abnormal tympanic membrane    Abnormal MRI    Hair loss    Anxiety    Memory loss           Objective:      /80 (BP Location: Right arm, Patient Position: Sitting, BP Method: Large (Manual))   Pulse 74   Ht 5' 5" (1.651 m)   Wt 78 kg (171 lb 15.3 oz)   SpO2 99%   BMI 28.62 kg/m²   Estimated body mass index is 28.62 kg/m² as calculated from the following:    Height as of this encounter: 5' 5" (1.651 m).    Weight as of this encounter: 78 kg (171 lb 15.3 oz).  Physical Exam   Constitutional: She is oriented to person, place, and time and well-developed, well-nourished, and in no distress. No distress.   HENT:   Head: Normocephalic and atraumatic.   Otosclerosis in Right ear   Eyes: Conjunctivae and EOM are normal. No scleral icterus.   Neck: Normal range of motion. Neck supple. No JVD present.   Cardiovascular: Normal rate and regular rhythm.   No murmur heard.  Pulmonary/Chest: Effort normal and breath sounds normal. No respiratory distress. She has no rales. She exhibits no tenderness.   Abdominal: Soft. Bowel sounds are normal. She exhibits no distension. There is no tenderness.   Musculoskeletal: Normal range of motion. She exhibits no edema.   Neurological: She is alert and oriented to person, place, and time. Gait normal.   Skin: Skin is warm and dry. No rash noted. She is not diaphoretic. No erythema. No pallor.   Psychiatric: Mood, memory, affect and judgment normal.         Assessment and Plan:   Encounter for Screening - Annual Physical  Patient has a " family history of Hypothyroidism. Sent Labs to [facility] and gave patient a print out of Lab orders  -- Ordered CBC, CMP, TSH; Free T4;  Future    Pituitary microadenoma with hyerprolactinemia  Last seen by neurology in 2017. MRI 4/2017 - 1.4 X 2 mm microadenoma of the pituitary. Prolactin level with 11.6. Patient is currently not on OCPs and Neurology felt that there was no need for therapy at that time as she was not experiencing galactorrhea, infertility and hypogonadism  -- Ordered IGF-1, Prolactin; Future  -- Patient reports having and appointment with Neurology in 1/2020       Problem List Items Addressed This Visit        Oncology    Pituitary microadenoma with hyperprolactinemia (Chronic)    Overview     Stable size on MRIs of neck. Followed by outside endocrinologist but patient [place] [facility]          Relevant Orders    Comprehensive metabolic panel    TSH    Prolactin    Insulin-like growth factor      Other Visit Diagnoses     Encounter for screening and preventative care    -  Primary    Relevant Orders    Comprehensive metabolic panel    CBC auto differential    TSH    T4, free          Follow Up:   Follow up in about 1 year (around 11/21/2020).        Annika Marquez

## 2019-11-24 LAB
ALBUMIN SERPL-MCNC: 4.4 G/DL (ref 3.6–5.1)
ALBUMIN/GLOB SERPL: 1.9 (CALC) (ref 1–2.5)
ALP SERPL-CCNC: 58 U/L (ref 33–115)
ALT SERPL-CCNC: 19 U/L (ref 6–29)
AST SERPL-CCNC: 17 U/L (ref 10–30)
BASOPHILS # BLD AUTO: 31 CELLS/UL (ref 0–200)
BASOPHILS NFR BLD AUTO: 0.4 %
BILIRUB SERPL-MCNC: 0.5 MG/DL (ref 0.2–1.2)
BUN SERPL-MCNC: 10 MG/DL (ref 7–25)
BUN/CREAT SERPL: NORMAL (CALC) (ref 6–22)
CALCIUM SERPL-MCNC: 9.7 MG/DL (ref 8.6–10.2)
CHLORIDE SERPL-SCNC: 105 MMOL/L (ref 98–110)
CO2 SERPL-SCNC: 26 MMOL/L (ref 20–32)
CREAT SERPL-MCNC: 0.61 MG/DL (ref 0.5–1.1)
EOSINOPHIL # BLD AUTO: 70 CELLS/UL (ref 15–500)
EOSINOPHIL NFR BLD AUTO: 0.9 %
ERYTHROCYTE [DISTWIDTH] IN BLOOD BY AUTOMATED COUNT: 12.4 % (ref 11–15)
GFRSERPLBLD MDRD-ARVRAT: 129 ML/MIN/1.73M2
GLOBULIN SER CALC-MCNC: 2.3 G/DL (CALC) (ref 1.9–3.7)
GLUCOSE SERPL-MCNC: 77 MG/DL (ref 65–99)
HCT VFR BLD AUTO: 37.9 % (ref 35–45)
HGB BLD-MCNC: 12.5 G/DL (ref 11.7–15.5)
IGF-I SERPL-MCNC: 160 NG/ML (ref 83–456)
IGF-I Z-SCORE SERPL: -0.7 SD
LYMPHOCYTES # BLD AUTO: 2278 CELLS/UL (ref 850–3900)
LYMPHOCYTES NFR BLD AUTO: 29.2 %
MCH RBC QN AUTO: 30 PG (ref 27–33)
MCHC RBC AUTO-ENTMCNC: 33 G/DL (ref 32–36)
MCV RBC AUTO: 90.9 FL (ref 80–100)
MONOCYTES # BLD AUTO: 382 CELLS/UL (ref 200–950)
MONOCYTES NFR BLD AUTO: 4.9 %
NEUTROPHILS # BLD AUTO: 5039 CELLS/UL (ref 1500–7800)
NEUTROPHILS NFR BLD AUTO: 64.6 %
PLATELET # BLD AUTO: 311 THOUSAND/UL (ref 140–400)
PMV BLD REES-ECKER: 11 FL (ref 7.5–12.5)
POTASSIUM SERPL-SCNC: 3.8 MMOL/L (ref 3.5–5.3)
PROLACTIN SERPL-MCNC: 6.8 NG/ML
PROT SERPL-MCNC: 6.7 G/DL (ref 6.1–8.1)
RBC # BLD AUTO: 4.17 MILLION/UL (ref 3.8–5.1)
SODIUM SERPL-SCNC: 138 MMOL/L (ref 135–146)
T4 FREE SERPL-MCNC: 0.9 NG/DL (ref 0.8–1.8)
TSH SERPL-ACNC: 6.81 MIU/L
WBC # BLD AUTO: 7.8 THOUSAND/UL (ref 3.8–10.8)

## 2019-11-26 ENCOUNTER — TELEPHONE (OUTPATIENT)
Dept: INTERNAL MEDICINE | Facility: CLINIC | Age: 24
End: 2019-11-26

## 2019-11-26 ENCOUNTER — PATIENT MESSAGE (OUTPATIENT)
Dept: INTERNAL MEDICINE | Facility: CLINIC | Age: 24
End: 2019-11-26

## 2019-11-26 DIAGNOSIS — E03.8 SUBCLINICAL HYPOTHYROIDISM: Primary | ICD-10-CM

## 2019-11-26 NOTE — TELEPHONE ENCOUNTER
Spoke with patient regarding her thyroid labs.   Subclinical hypothyroidism.  TSH <6.9, +/- symptoms.   Will repeat labs and have patient RTC to clinic in Jan.   Labs ordered.         Linda Michaud MD     Internal Medicine PGY3

## 2019-12-02 ENCOUNTER — TELEPHONE (OUTPATIENT)
Dept: INTERNAL MEDICINE | Facility: CLINIC | Age: 24
End: 2019-12-02

## 2019-12-02 ENCOUNTER — PATIENT MESSAGE (OUTPATIENT)
Dept: INTERNAL MEDICINE | Facility: CLINIC | Age: 24
End: 2019-12-02

## 2019-12-02 RX ORDER — LEVOTHYROXINE SODIUM 75 UG/1
75 TABLET ORAL
Qty: 30 TABLET | Refills: 3 | Status: SHIPPED | OUTPATIENT
Start: 2019-12-02 | End: 2020-03-23 | Stop reason: SDUPTHER

## 2019-12-02 NOTE — TELEPHONE ENCOUNTER
----- Message from Edy Scott sent at 12/2/2019 11:57 AM CST -----  Contact: self/[phone]  Pt is calling to discuss blood work results. Please advise.          Thank You

## 2019-12-02 NOTE — TELEPHONE ENCOUNTER
Called patient to discuss labs. Explained that the patient has subclinical Hypothyroidism. Discussed pros and cons of starting synthroid   vs waiting. Patient said she is symptomatic and is having hair thinning, weight gain, dry skin and brittle nails and would like to start synthroid. Family history positive for hypothyroidism in mom and hashimoto's in Aunt. Started on 75 mcg of Synthroid and will work up to 1.6 mcg/Kg. Repeat TSH, T4 in 3 months along with Anti-TPO to titrate meds and to determine if patient has Hashimotos.

## 2020-03-04 ENCOUNTER — PATIENT MESSAGE (OUTPATIENT)
Dept: INTERNAL MEDICINE | Facility: CLINIC | Age: 25
End: 2020-03-04

## 2020-03-04 DIAGNOSIS — E03.8 SUBCLINICAL HYPOTHYROIDISM: Primary | ICD-10-CM

## 2020-03-17 LAB
T4 FREE SERPL-MCNC: 1.3 NG/DL (ref 0.8–1.8)
THYROPEROXIDASE AB SERPL-ACNC: 814 IU/ML
TSH SERPL-ACNC: 2.29 MIU/L

## 2020-03-19 ENCOUNTER — PATIENT MESSAGE (OUTPATIENT)
Dept: INTERNAL MEDICINE | Facility: CLINIC | Age: 25
End: 2020-03-19

## 2020-03-20 NOTE — TELEPHONE ENCOUNTER
Patient was called and told that her results are consistent with Hashimoto's thyroiditis and to continue on the current synthroid medication dosage.

## 2020-03-23 DIAGNOSIS — E03.9 HYPOTHYROIDISM, UNSPECIFIED TYPE: ICD-10-CM

## 2020-03-23 RX ORDER — LEVOTHYROXINE SODIUM 75 UG/1
75 TABLET ORAL
Qty: 30 TABLET | Refills: 3 | Status: SHIPPED | OUTPATIENT
Start: 2020-03-23 | End: 2020-07-07 | Stop reason: SDUPTHER

## 2020-05-29 ENCOUNTER — OFFICE VISIT (OUTPATIENT)
Dept: OTOLARYNGOLOGY | Facility: CLINIC | Age: 25
End: 2020-05-29
Payer: COMMERCIAL

## 2020-05-29 ENCOUNTER — PATIENT MESSAGE (OUTPATIENT)
Dept: OTOLARYNGOLOGY | Facility: CLINIC | Age: 25
End: 2020-05-29

## 2020-05-29 VITALS
SYSTOLIC BLOOD PRESSURE: 117 MMHG | BODY MASS INDEX: 29.12 KG/M2 | WEIGHT: 175 LBS | DIASTOLIC BLOOD PRESSURE: 83 MMHG | HEART RATE: 91 BPM

## 2020-05-29 DIAGNOSIS — R22.0 SUBMANDIBULAR SWELLING: ICD-10-CM

## 2020-05-29 DIAGNOSIS — R22.1 SUBMANDIBULAR SWELLING: ICD-10-CM

## 2020-05-29 DIAGNOSIS — K11.5 SIALOLITH: Primary | ICD-10-CM

## 2020-05-29 PROCEDURE — 3008F BODY MASS INDEX DOCD: CPT | Mod: CPTII,S$GLB,, | Performed by: NURSE PRACTITIONER

## 2020-05-29 PROCEDURE — 99999 PR PBB SHADOW E&M-EST. PATIENT-LVL III: CPT | Mod: PBBFAC,,, | Performed by: NURSE PRACTITIONER

## 2020-05-29 PROCEDURE — 99999 PR PBB SHADOW E&M-EST. PATIENT-LVL III: ICD-10-PCS | Mod: PBBFAC,,, | Performed by: NURSE PRACTITIONER

## 2020-05-29 PROCEDURE — 3008F PR BODY MASS INDEX (BMI) DOCUMENTED: ICD-10-PCS | Mod: CPTII,S$GLB,, | Performed by: NURSE PRACTITIONER

## 2020-05-29 PROCEDURE — 99203 PR OFFICE/OUTPT VISIT, NEW, LEVL III, 30-44 MIN: ICD-10-PCS | Mod: S$GLB,,, | Performed by: NURSE PRACTITIONER

## 2020-05-29 PROCEDURE — 99203 OFFICE O/P NEW LOW 30 MIN: CPT | Mod: S$GLB,,, | Performed by: NURSE PRACTITIONER

## 2020-05-29 NOTE — PROGRESS NOTES
Subjective:       Patient ID: Jolly Whyte is a 24 y.o. female.    Chief Complaint: swollen salivary glands    HPI     Jolly Whyte is a 24 year old female that [facility] for submandibular swelling. She states that last week she developed intermittent right sided submandibular swelling and pain. This occurs mostly after eating or whenever she is dehydrated. She states she has a history of salivary stones and has required surgery in the past for stone removal. She has been trying eating sour candies and massaging her gland with mild relief, but never complete resolution of swelling. However her pain will resolve. She denies fever, chills, or drainage inside her mouth. She thinks her left SMG is enlarged as well.    Past Medical History:   Diagnosis Date    Chronic nonintractable headache 4/21/2017    Factor V deficiency 4/21/2017    Pituitary microadenoma with hyperprolactinemia 5/7/2015       Past Surgical History:   Procedure Laterality Date    left knee debridement Left     age 7         Current Outpatient Medications:     AFLURIA QUAD [id number], PF, 60 mcg/0.5 mL vaccine, TO BE ADMINISTERED BY PHARMACIST FOR IMMUNIZATION, Disp: , Rfl: 0    levothyroxine (SYNTHROID) 75 MCG tablet, Take 1 tablet (75 mcg total) by mouth before breakfast. for 30 doses, Disp: 30 tablet, Rfl: 3    Review of patient's allergies indicates:  No Known Allergies    Social History     Socioeconomic History    Marital status: Single     Spouse name: Not on file    Number of children: Not on file    Years of education: Not on file    Highest education level: Not on file   Occupational History    Occupation:    Social Needs    Financial resource strain: Not on file    Food insecurity:     Worry: Not on file     Inability: Not on file    Transportation needs:     Medical: Not on file     Non-medical: Not on file   Tobacco Use    Smoking status: Never Smoker    Smokeless tobacco:  Current User   Substance and Sexual Activity    Alcohol use: No    Drug use: No    Sexual activity: Yes   Lifestyle    Physical activity:     Days per week: Not on file     Minutes per session: Not on file    Stress: Not on file   Relationships    Social connections:     Talks on phone: Not on file     Gets together: Not on file     Attends [employer] service: Not on file     Active member of club or organization: Not on file     Attends meetings of clubs or organizations: Not on file     Relationship status: Not on file   Other Topics Concern    Not on file   Social History Narrative    Not on file       Family History   Problem Relation Age of Onset    Chiari malformation Mother     Myasthenia gravis Mother     Migraines Maternal Aunt          Review of Systems   Constitutional: Negative for appetite change, chills, diaphoresis, fatigue, fever and unexpected weight change.   HENT: Positive for hearing loss. Negative for congestion, dental problem, drooling, ear discharge, ear pain, facial swelling, mouth sores, nosebleeds, postnasal drip, rhinorrhea, sinus pressure, sneezing, sore throat, tinnitus, trouble swallowing and voice change.    Eyes: Negative for pain, discharge, redness and itching.   Respiratory: Negative for cough and shortness of breath.    Cardiovascular: Negative for chest pain.   Gastrointestinal: Negative for abdominal distention, abdominal pain, diarrhea, nausea and vomiting.   Endocrine: Negative for cold intolerance and heat intolerance.   Genitourinary: Negative for difficulty urinating.   Musculoskeletal: Positive for neck pain. Negative for neck stiffness.   Skin: Negative for rash.   Neurological: Positive for headaches. Negative for dizziness and weakness.   Hematological: Negative for adenopathy.       Objective:      Physical Exam   Constitutional: She is oriented to person, place, and time. She appears well-developed and well-nourished. She is cooperative. She does not appear  ill. No distress.   HENT:   Head: Normocephalic and atraumatic.   Right Ear: Hearing and external ear normal.   Left Ear: Hearing and external ear normal.   Nose: Nose normal.   Mouth/Throat: Uvula is midline, oropharynx is clear and moist and mucous membranes are normal. Mucous membranes are not pale, not dry and not cyanotic. She does not have dentures. No oral lesions. No trismus in the jaw. Normal dentition. No uvula swelling or dental caries. No oropharyngeal exudate, posterior oropharyngeal edema, posterior oropharyngeal erythema or tonsillar abscesses.   Clear saliva expressed from salivary ducts.   Eyes: Conjunctivae are normal. Right eye exhibits no discharge. Left eye exhibits no discharge. No scleral icterus.   Neck: Trachea normal, normal range of motion and phonation normal. Neck supple. No JVD present. No tracheal tenderness present. No tracheal deviation present. No thyroid mass and no thyromegaly present.       Salivary glands - there are no lesions or asymmetric findings in the submandibular or parotid glands     Cardiovascular: Normal rate.   Pulmonary/Chest: Effort normal. No stridor. No respiratory distress.   Lymphadenopathy:        Head (right side): No submental, no submandibular, no tonsillar and no preauricular adenopathy present.        Head (left side): No submental, no submandibular, no tonsillar and no preauricular adenopathy present.     She has no cervical adenopathy.   Neurological: She is alert and oriented to person, place, and time. No cranial nerve deficit.   Skin: Skin is warm and dry. No rash noted. She is not diaphoretic. No erythema. No pallor.   Psychiatric: She has a normal mood and affect. Her behavior is normal. Thought content normal.   Vitals reviewed.      Assessment:       1. Sialolith    2. Submandibular swelling        Plan:       Problem List Items Addressed This Visit        ENT    Sialolith - Primary    Relevant Orders    CT Soft Tissue Neck With Contrast     Creatinine, serum    Submandibular swelling     24 year old female with symptoms and history consistent with submandibular sialoliths. I do not see any evidence of sialadenitis today. I have ordered a CT scan to evaluate for stones and to guide treatment (surgery vs observation vs in office removal). Questions answered.          Relevant Orders    CT Soft Tissue Neck With Contrast    Creatinine, serum

## 2020-05-29 NOTE — ASSESSMENT & PLAN NOTE
24 year old female with symptoms and history consistent with submandibular sialoliths. I do not see any evidence of sialadenitis today. I have ordered a CT scan to evaluate for stones and to guide treatment (surgery vs observation vs in office removal). Questions answered.

## 2020-05-29 NOTE — LETTER
May 29, 2020      Annika Marquez DO  [address]  [facility] [address]           [facility]  [address]  [facility] [address]  Phone: [phone]  Fax: [phone]          Patient: Jolly Whyte   MR Number: [id number]   YOB: 1995   Date of Visit: 5/29/2020       Dear Dr. Annika Marquez:    Thank you for referring Jolly Whyte to me for evaluation. Attached you will find relevant portions of my assessment and plan of care.    If you have questions, please do not hesitate to call me. I look forward to following Jolly Whyte along with you.    Sincerely,    Kylah Antonio, NP    Enclosure  CC:  No Recipients    If you would like to receive this communication electronically, please contact [email or web address] or [phone] to request more information on [facility] [employer] access.    For providers and/or their staff who would like to refer a patient to [facility], please contact us through our one-stop-shop provider referral line, [facility], at [phone].    If you feel you have received this communication in error or would no longer like to receive these types of communications, please e-mail [email or web address]

## 2020-06-12 ENCOUNTER — TELEPHONE (OUTPATIENT)
Dept: OTOLARYNGOLOGY | Facility: CLINIC | Age: 25
End: 2020-06-12

## 2020-07-07 DIAGNOSIS — E03.9 HYPOTHYROIDISM, UNSPECIFIED TYPE: ICD-10-CM

## 2020-07-07 RX ORDER — LEVOTHYROXINE SODIUM 75 UG/1
75 TABLET ORAL
Qty: 30 TABLET | Refills: 3 | Status: SHIPPED | OUTPATIENT
Start: 2020-07-07 | End: 2020-10-01 | Stop reason: SDUPTHER

## 2020-10-01 DIAGNOSIS — E03.9 HYPOTHYROIDISM, UNSPECIFIED TYPE: ICD-10-CM

## 2020-10-01 RX ORDER — LEVOTHYROXINE SODIUM 75 UG/1
75 TABLET ORAL
Qty: 30 TABLET | Refills: 0 | Status: SHIPPED | OUTPATIENT
Start: 2020-10-01 | End: 2020-10-30 | Stop reason: SDUPTHER

## 2020-10-04 ENCOUNTER — PATIENT MESSAGE (OUTPATIENT)
Dept: INTERNAL MEDICINE | Facility: CLINIC | Age: 25
End: 2020-10-04

## 2020-10-06 DIAGNOSIS — B37.9 YEAST INFECTION: Primary | ICD-10-CM

## 2020-10-06 RX ORDER — FLUCONAZOLE 150 MG/1
150 TABLET ORAL ONCE
Qty: 1 TABLET | Refills: 0 | Status: SHIPPED | OUTPATIENT
Start: 2020-10-06 | End: 2020-10-06

## 2020-10-06 NOTE — PROGRESS NOTES
Patient reports have symptoms of a candida vulvovaginitis. Will give one time dose of Fluconazole 150 mg. Please have patient follow up if symptoms do not Improve in 72 hours.

## 2020-10-30 DIAGNOSIS — E03.9 HYPOTHYROIDISM, UNSPECIFIED TYPE: ICD-10-CM

## 2020-11-02 RX ORDER — LEVOTHYROXINE SODIUM 75 UG/1
75 TABLET ORAL
Qty: 30 TABLET | Refills: 3 | Status: SHIPPED | OUTPATIENT
Start: 2020-11-02 | End: 2021-01-26 | Stop reason: SDUPTHER

## 2021-01-26 DIAGNOSIS — E03.9 HYPOTHYROIDISM, UNSPECIFIED TYPE: ICD-10-CM

## 2021-01-26 RX ORDER — LEVOTHYROXINE SODIUM 75 UG/1
75 TABLET ORAL
Qty: 30 TABLET | Refills: 3 | Status: SHIPPED | OUTPATIENT
Start: 2021-01-26 | End: 2021-05-03 | Stop reason: SDUPTHER

## 2021-03-25 ENCOUNTER — OFFICE VISIT (OUTPATIENT)
Dept: DERMATOLOGY | Facility: CLINIC | Age: 26
End: 2021-03-25
Payer: COMMERCIAL

## 2021-03-25 DIAGNOSIS — L42 PITYRIASIS ROSEA: Primary | ICD-10-CM

## 2021-03-25 PROCEDURE — 99202 OFFICE O/P NEW SF 15 MIN: CPT | Mod: 95,,, | Performed by: STUDENT IN AN ORGANIZED HEALTH CARE EDUCATION/TRAINING PROGRAM

## 2021-03-25 PROCEDURE — 99202 PR OFFICE/OUTPT VISIT, NEW, LEVL II, 15-29 MIN: ICD-10-PCS | Mod: 95,,, | Performed by: STUDENT IN AN ORGANIZED HEALTH CARE EDUCATION/TRAINING PROGRAM

## 2021-03-25 RX ORDER — TRIAMCINOLONE ACETONIDE 1 MG/G
CREAM TOPICAL
Qty: 454 G | Refills: 0 | Status: SHIPPED | OUTPATIENT
Start: 2021-03-25

## 2021-05-03 DIAGNOSIS — E03.9 HYPOTHYROIDISM, UNSPECIFIED TYPE: ICD-10-CM

## 2021-05-03 RX ORDER — LEVOTHYROXINE SODIUM 75 UG/1
75 TABLET ORAL
Qty: 30 TABLET | Refills: 3 | Status: SHIPPED | OUTPATIENT
Start: 2021-05-03 | End: 2021-08-18 | Stop reason: SDUPTHER

## 2021-05-04 ENCOUNTER — OFFICE VISIT (OUTPATIENT)
Dept: URGENT CARE | Facility: CLINIC | Age: 26
End: 2021-05-04
Payer: COMMERCIAL

## 2021-05-04 VITALS
BODY MASS INDEX: 28.32 KG/M2 | HEIGHT: 65 IN | TEMPERATURE: 99 F | SYSTOLIC BLOOD PRESSURE: 101 MMHG | WEIGHT: 170 LBS | HEART RATE: 85 BPM | RESPIRATION RATE: 18 BRPM | OXYGEN SATURATION: 99 % | DIASTOLIC BLOOD PRESSURE: 66 MMHG

## 2021-05-04 DIAGNOSIS — J02.9 SORE THROAT: ICD-10-CM

## 2021-05-04 DIAGNOSIS — Z11.52 ENCOUNTER FOR SCREENING FOR COVID-19: ICD-10-CM

## 2021-05-04 DIAGNOSIS — J02.9 EXUDATIVE PHARYNGITIS: Primary | ICD-10-CM

## 2021-05-04 LAB
CTP QC/QA: YES
CTP QC/QA: YES
MOLECULAR STREP A: NEGATIVE
SARS-COV-2 RDRP RESP QL NAA+PROBE: NEGATIVE

## 2021-05-04 PROCEDURE — 99214 PR OFFICE/OUTPT VISIT, EST, LEVL IV, 30-39 MIN: ICD-10-PCS | Mod: S$GLB,CS,, | Performed by: PHYSICIAN ASSISTANT

## 2021-05-04 PROCEDURE — 3008F PR BODY MASS INDEX (BMI) DOCUMENTED: ICD-10-PCS | Mod: CPTII,S$GLB,, | Performed by: PHYSICIAN ASSISTANT

## 2021-05-04 PROCEDURE — 87651 STREP A DNA AMP PROBE: CPT | Mod: QW,S$GLB,, | Performed by: PHYSICIAN ASSISTANT

## 2021-05-04 PROCEDURE — U0002 COVID-19 LAB TEST NON-CDC: HCPCS | Mod: QW,S$GLB,, | Performed by: PHYSICIAN ASSISTANT

## 2021-05-04 PROCEDURE — 1125F AMNT PAIN NOTED PAIN PRSNT: CPT | Mod: S$GLB,,, | Performed by: PHYSICIAN ASSISTANT

## 2021-05-04 PROCEDURE — 99214 OFFICE O/P EST MOD 30 MIN: CPT | Mod: S$GLB,CS,, | Performed by: PHYSICIAN ASSISTANT

## 2021-05-04 PROCEDURE — 1125F PR PAIN SEVERITY QUANTIFIED, PAIN PRESENT: ICD-10-PCS | Mod: S$GLB,,, | Performed by: PHYSICIAN ASSISTANT

## 2021-05-04 PROCEDURE — 87651 POCT STREP A MOLECULAR: ICD-10-PCS | Mod: QW,S$GLB,, | Performed by: PHYSICIAN ASSISTANT

## 2021-05-04 PROCEDURE — 3008F BODY MASS INDEX DOCD: CPT | Mod: CPTII,S$GLB,, | Performed by: PHYSICIAN ASSISTANT

## 2021-05-04 PROCEDURE — U0002: ICD-10-PCS | Mod: QW,S$GLB,, | Performed by: PHYSICIAN ASSISTANT

## 2021-05-04 RX ORDER — AMOXICILLIN 500 MG/1
500 TABLET, FILM COATED ORAL EVERY 12 HOURS
Qty: 20 TABLET | Refills: 0 | Status: SHIPPED | OUTPATIENT
Start: 2021-05-04 | End: 2021-05-14

## 2021-08-18 DIAGNOSIS — E03.9 HYPOTHYROIDISM, UNSPECIFIED TYPE: ICD-10-CM

## 2021-08-19 RX ORDER — LEVOTHYROXINE SODIUM 75 UG/1
75 TABLET ORAL
Qty: 30 TABLET | Refills: 3 | Status: SHIPPED | OUTPATIENT
Start: 2021-08-19 | End: 2021-12-14 | Stop reason: SDUPTHER

## 2021-09-23 ENCOUNTER — LAB VISIT (OUTPATIENT)
Dept: LAB | Facility: HOSPITAL | Age: 26
End: 2021-09-23
Payer: COMMERCIAL

## 2021-09-23 ENCOUNTER — OFFICE VISIT (OUTPATIENT)
Dept: ENDOCRINOLOGY | Facility: CLINIC | Age: 26
End: 2021-09-23
Payer: COMMERCIAL

## 2021-09-23 VITALS
HEART RATE: 81 BPM | DIASTOLIC BLOOD PRESSURE: 80 MMHG | WEIGHT: 183.19 LBS | SYSTOLIC BLOOD PRESSURE: 100 MMHG | OXYGEN SATURATION: 97 % | BODY MASS INDEX: 35.96 KG/M2 | HEIGHT: 60 IN

## 2021-09-23 DIAGNOSIS — E06.3 HASHIMOTO'S DISEASE: ICD-10-CM

## 2021-09-23 DIAGNOSIS — D35.2 PITUITARY MICROADENOMA WITH HYPERPROLACTINEMIA: Primary | Chronic | ICD-10-CM

## 2021-09-23 DIAGNOSIS — E22.9 PITUITARY MICROADENOMA WITH HYPERPROLACTINEMIA: Primary | Chronic | ICD-10-CM

## 2021-09-23 LAB
25(OH)D3+25(OH)D2 SERPL-MCNC: 24 NG/ML (ref 30–96)
ALBUMIN SERPL BCP-MCNC: 4.1 G/DL (ref 3.5–5.2)
ALP SERPL-CCNC: 75 U/L (ref 55–135)
ALT SERPL W/O P-5'-P-CCNC: 34 U/L (ref 10–44)
ANION GAP SERPL CALC-SCNC: 11 MMOL/L (ref 8–16)
AST SERPL-CCNC: 24 U/L (ref 10–40)
BILIRUB SERPL-MCNC: 0.5 MG/DL (ref 0.1–1)
BUN SERPL-MCNC: 11 MG/DL (ref 6–20)
CALCIUM SERPL-MCNC: 9.8 MG/DL (ref 8.7–10.5)
CHLORIDE SERPL-SCNC: 103 MMOL/L (ref 95–110)
CO2 SERPL-SCNC: 25 MMOL/L (ref 23–29)
CREAT SERPL-MCNC: 0.7 MG/DL (ref 0.5–1.4)
EST. GFR  (AFRICAN AMERICAN): >60 ML/MIN/1.73 M^2
EST. GFR  (NON AFRICAN AMERICAN): >60 ML/MIN/1.73 M^2
GLUCOSE SERPL-MCNC: 77 MG/DL (ref 70–110)
POTASSIUM SERPL-SCNC: 3.8 MMOL/L (ref 3.5–5.1)
PROT SERPL-MCNC: 7 G/DL (ref 6–8.4)
SODIUM SERPL-SCNC: 139 MMOL/L (ref 136–145)
T4 FREE SERPL-MCNC: 1.02 NG/DL (ref 0.71–1.51)
TSH SERPL DL<=0.005 MIU/L-ACNC: 1.59 UIU/ML (ref 0.4–4)

## 2021-09-23 PROCEDURE — 3008F BODY MASS INDEX DOCD: CPT | Mod: CPTII,S$GLB,, | Performed by: NURSE PRACTITIONER

## 2021-09-23 PROCEDURE — 3079F PR MOST RECENT DIASTOLIC BLOOD PRESSURE 80-89 MM HG: ICD-10-PCS | Mod: CPTII,S$GLB,, | Performed by: NURSE PRACTITIONER

## 2021-09-23 PROCEDURE — 3074F SYST BP LT 130 MM HG: CPT | Mod: CPTII,S$GLB,, | Performed by: NURSE PRACTITIONER

## 2021-09-23 PROCEDURE — 84443 ASSAY THYROID STIM HORMONE: CPT | Performed by: NURSE PRACTITIONER

## 2021-09-23 PROCEDURE — 3074F PR MOST RECENT SYSTOLIC BLOOD PRESSURE < 130 MM HG: ICD-10-PCS | Mod: CPTII,S$GLB,, | Performed by: NURSE PRACTITIONER

## 2021-09-23 PROCEDURE — 99999 PR PBB SHADOW E&M-EST. PATIENT-LVL III: ICD-10-PCS | Mod: PBBFAC,,, | Performed by: NURSE PRACTITIONER

## 2021-09-23 PROCEDURE — 99204 PR OFFICE/OUTPT VISIT, NEW, LEVL IV, 45-59 MIN: ICD-10-PCS | Mod: S$GLB,,, | Performed by: NURSE PRACTITIONER

## 2021-09-23 PROCEDURE — 99999 PR PBB SHADOW E&M-EST. PATIENT-LVL III: CPT | Mod: PBBFAC,,, | Performed by: NURSE PRACTITIONER

## 2021-09-23 PROCEDURE — 80053 COMPREHEN METABOLIC PANEL: CPT | Performed by: NURSE PRACTITIONER

## 2021-09-23 PROCEDURE — 1160F RVW MEDS BY RX/DR IN RCRD: CPT | Mod: CPTII,S$GLB,, | Performed by: NURSE PRACTITIONER

## 2021-09-23 PROCEDURE — 1160F PR REVIEW ALL MEDS BY PRESCRIBER/CLIN PHARMACIST DOCUMENTED: ICD-10-PCS | Mod: CPTII,S$GLB,, | Performed by: NURSE PRACTITIONER

## 2021-09-23 PROCEDURE — 3008F PR BODY MASS INDEX (BMI) DOCUMENTED: ICD-10-PCS | Mod: CPTII,S$GLB,, | Performed by: NURSE PRACTITIONER

## 2021-09-23 PROCEDURE — 1159F PR MEDICATION LIST DOCUMENTED IN MEDICAL RECORD: ICD-10-PCS | Mod: CPTII,S$GLB,, | Performed by: NURSE PRACTITIONER

## 2021-09-23 PROCEDURE — 99204 OFFICE O/P NEW MOD 45 MIN: CPT | Mod: S$GLB,,, | Performed by: NURSE PRACTITIONER

## 2021-09-23 PROCEDURE — 3079F DIAST BP 80-89 MM HG: CPT | Mod: CPTII,S$GLB,, | Performed by: NURSE PRACTITIONER

## 2021-09-23 PROCEDURE — 84439 ASSAY OF FREE THYROXINE: CPT | Performed by: NURSE PRACTITIONER

## 2021-09-23 PROCEDURE — 1159F MED LIST DOCD IN RCRD: CPT | Mod: CPTII,S$GLB,, | Performed by: NURSE PRACTITIONER

## 2021-09-23 PROCEDURE — 82306 VITAMIN D 25 HYDROXY: CPT | Performed by: NURSE PRACTITIONER

## 2021-09-23 PROCEDURE — 36415 COLL VENOUS BLD VENIPUNCTURE: CPT | Performed by: NURSE PRACTITIONER

## 2021-09-23 RX ORDER — DEXAMETHASONE 1 MG/1
1 TABLET ORAL ONCE
Qty: 1 TABLET | Refills: 0 | Status: SHIPPED | OUTPATIENT
Start: 2021-09-23 | End: 2021-09-23

## 2021-09-24 ENCOUNTER — PATIENT MESSAGE (OUTPATIENT)
Dept: ENDOCRINOLOGY | Facility: CLINIC | Age: 26
End: 2021-09-24

## 2021-09-27 ENCOUNTER — PATIENT MESSAGE (OUTPATIENT)
Dept: ENDOCRINOLOGY | Facility: CLINIC | Age: 26
End: 2021-09-27

## 2021-09-27 ENCOUNTER — LAB VISIT (OUTPATIENT)
Dept: LAB | Facility: HOSPITAL | Age: 26
End: 2021-09-27
Attending: NURSE PRACTITIONER
Payer: COMMERCIAL

## 2021-09-27 DIAGNOSIS — E22.9 PITUITARY MICROADENOMA WITH HYPERPROLACTINEMIA: Chronic | ICD-10-CM

## 2021-09-27 DIAGNOSIS — D35.2 PITUITARY MICROADENOMA WITH HYPERPROLACTINEMIA: Chronic | ICD-10-CM

## 2021-09-27 LAB — CORTIS SERPL-MCNC: <1 UG/DL (ref 4.3–22.4)

## 2021-09-27 PROCEDURE — 82542 COL CHROMOTOGRAPHY QUAL/QUAN: CPT | Performed by: NURSE PRACTITIONER

## 2021-09-27 PROCEDURE — 36415 COLL VENOUS BLD VENIPUNCTURE: CPT | Performed by: NURSE PRACTITIONER

## 2021-09-27 PROCEDURE — 82533 TOTAL CORTISOL: CPT | Performed by: NURSE PRACTITIONER

## 2021-10-07 LAB — DEXAMETHASONE SERPL-MCNC: NORMAL NG/DL

## 2021-10-11 ENCOUNTER — PATIENT MESSAGE (OUTPATIENT)
Dept: ENDOCRINOLOGY | Facility: CLINIC | Age: 26
End: 2021-10-11

## 2021-10-21 ENCOUNTER — IMMUNIZATION (OUTPATIENT)
Dept: FAMILY MEDICINE | Facility: CLINIC | Age: 26
End: 2021-10-21
Payer: COMMERCIAL

## 2021-10-21 DIAGNOSIS — Z23 NEED FOR VACCINATION: Primary | ICD-10-CM

## 2021-10-21 PROCEDURE — 0001A COVID-19, MRNA, LNP-S, PF, 30 MCG/0.3 ML DOSE VACCINE: CPT | Mod: ,,, | Performed by: FAMILY MEDICINE

## 2021-10-21 PROCEDURE — 91300 COVID-19, MRNA, LNP-S, PF, 30 MCG/0.3 ML DOSE VACCINE: ICD-10-PCS | Mod: ,,, | Performed by: FAMILY MEDICINE

## 2021-10-21 PROCEDURE — 91300 COVID-19, MRNA, LNP-S, PF, 30 MCG/0.3 ML DOSE VACCINE: CPT | Mod: ,,, | Performed by: FAMILY MEDICINE

## 2021-10-21 PROCEDURE — 0001A COVID-19, MRNA, LNP-S, PF, 30 MCG/0.3 ML DOSE VACCINE: ICD-10-PCS | Mod: ,,, | Performed by: FAMILY MEDICINE

## 2021-11-11 ENCOUNTER — IMMUNIZATION (OUTPATIENT)
Dept: FAMILY MEDICINE | Facility: CLINIC | Age: 26
End: 2021-11-11
Payer: COMMERCIAL

## 2021-11-11 ENCOUNTER — OFFICE VISIT (OUTPATIENT)
Dept: OBSTETRICS AND GYNECOLOGY | Facility: CLINIC | Age: 26
End: 2021-11-11
Payer: COMMERCIAL

## 2021-11-11 VITALS
SYSTOLIC BLOOD PRESSURE: 110 MMHG | BODY MASS INDEX: 35.57 KG/M2 | DIASTOLIC BLOOD PRESSURE: 72 MMHG | WEIGHT: 181.19 LBS | HEIGHT: 60 IN

## 2021-11-11 DIAGNOSIS — Z23 NEED FOR VACCINATION: Primary | ICD-10-CM

## 2021-11-11 DIAGNOSIS — Z01.419 WELL WOMAN EXAM WITH ROUTINE GYNECOLOGICAL EXAM: Primary | ICD-10-CM

## 2021-11-11 PROCEDURE — 99385 PR PREVENTIVE VISIT,NEW,18-39: ICD-10-PCS | Mod: S$GLB,,, | Performed by: OBSTETRICS & GYNECOLOGY

## 2021-11-11 PROCEDURE — 0002A COVID-19, MRNA, LNP-S, PF, 30 MCG/0.3 ML DOSE VACCINE: CPT | Mod: ,,, | Performed by: FAMILY MEDICINE

## 2021-11-11 PROCEDURE — 3008F PR BODY MASS INDEX (BMI) DOCUMENTED: ICD-10-PCS | Mod: CPTII,S$GLB,, | Performed by: OBSTETRICS & GYNECOLOGY

## 2021-11-11 PROCEDURE — 91300 COVID-19, MRNA, LNP-S, PF, 30 MCG/0.3 ML DOSE VACCINE: ICD-10-PCS | Mod: ,,, | Performed by: FAMILY MEDICINE

## 2021-11-11 PROCEDURE — 99999 PR PBB SHADOW E&M-EST. PATIENT-LVL III: ICD-10-PCS | Mod: PBBFAC,,, | Performed by: OBSTETRICS & GYNECOLOGY

## 2021-11-11 PROCEDURE — 1160F RVW MEDS BY RX/DR IN RCRD: CPT | Mod: CPTII,S$GLB,, | Performed by: OBSTETRICS & GYNECOLOGY

## 2021-11-11 PROCEDURE — 1159F MED LIST DOCD IN RCRD: CPT | Mod: CPTII,S$GLB,, | Performed by: OBSTETRICS & GYNECOLOGY

## 2021-11-11 PROCEDURE — 99999 PR PBB SHADOW E&M-EST. PATIENT-LVL III: CPT | Mod: PBBFAC,,, | Performed by: OBSTETRICS & GYNECOLOGY

## 2021-11-11 PROCEDURE — 1160F PR REVIEW ALL MEDS BY PRESCRIBER/CLIN PHARMACIST DOCUMENTED: ICD-10-PCS | Mod: CPTII,S$GLB,, | Performed by: OBSTETRICS & GYNECOLOGY

## 2021-11-11 PROCEDURE — 88175 CYTOPATH C/V AUTO FLUID REDO: CPT | Performed by: OBSTETRICS & GYNECOLOGY

## 2021-11-11 PROCEDURE — 3008F BODY MASS INDEX DOCD: CPT | Mod: CPTII,S$GLB,, | Performed by: OBSTETRICS & GYNECOLOGY

## 2021-11-11 PROCEDURE — 1159F PR MEDICATION LIST DOCUMENTED IN MEDICAL RECORD: ICD-10-PCS | Mod: CPTII,S$GLB,, | Performed by: OBSTETRICS & GYNECOLOGY

## 2021-11-11 PROCEDURE — 3074F PR MOST RECENT SYSTOLIC BLOOD PRESSURE < 130 MM HG: ICD-10-PCS | Mod: CPTII,S$GLB,, | Performed by: OBSTETRICS & GYNECOLOGY

## 2021-11-11 PROCEDURE — 3074F SYST BP LT 130 MM HG: CPT | Mod: CPTII,S$GLB,, | Performed by: OBSTETRICS & GYNECOLOGY

## 2021-11-11 PROCEDURE — 0002A COVID-19, MRNA, LNP-S, PF, 30 MCG/0.3 ML DOSE VACCINE: ICD-10-PCS | Mod: ,,, | Performed by: FAMILY MEDICINE

## 2021-11-11 PROCEDURE — 3078F DIAST BP <80 MM HG: CPT | Mod: CPTII,S$GLB,, | Performed by: OBSTETRICS & GYNECOLOGY

## 2021-11-11 PROCEDURE — 91300 COVID-19, MRNA, LNP-S, PF, 30 MCG/0.3 ML DOSE VACCINE: CPT | Mod: ,,, | Performed by: FAMILY MEDICINE

## 2021-11-11 PROCEDURE — 3078F PR MOST RECENT DIASTOLIC BLOOD PRESSURE < 80 MM HG: ICD-10-PCS | Mod: CPTII,S$GLB,, | Performed by: OBSTETRICS & GYNECOLOGY

## 2021-11-11 PROCEDURE — 99385 PREV VISIT NEW AGE 18-39: CPT | Mod: S$GLB,,, | Performed by: OBSTETRICS & GYNECOLOGY

## 2021-11-16 ENCOUNTER — PATIENT MESSAGE (OUTPATIENT)
Dept: OBSTETRICS AND GYNECOLOGY | Facility: CLINIC | Age: 26
End: 2021-11-16
Payer: COMMERCIAL

## 2021-12-06 ENCOUNTER — OFFICE VISIT (OUTPATIENT)
Dept: URGENT CARE | Facility: CLINIC | Age: 26
End: 2021-12-06
Payer: COMMERCIAL

## 2021-12-06 VITALS
RESPIRATION RATE: 18 BRPM | TEMPERATURE: 97 F | HEART RATE: 66 BPM | HEIGHT: 60 IN | SYSTOLIC BLOOD PRESSURE: 100 MMHG | OXYGEN SATURATION: 99 % | WEIGHT: 181 LBS | DIASTOLIC BLOOD PRESSURE: 66 MMHG | BODY MASS INDEX: 35.53 KG/M2

## 2021-12-06 DIAGNOSIS — J02.9 SORE THROAT: ICD-10-CM

## 2021-12-06 DIAGNOSIS — R09.81 SINUS CONGESTION: ICD-10-CM

## 2021-12-06 DIAGNOSIS — B34.9 VIRAL SYNDROME: Primary | ICD-10-CM

## 2021-12-06 PROCEDURE — 87651 POCT STREP A MOLECULAR: ICD-10-PCS | Mod: QW,S$GLB,, | Performed by: NURSE PRACTITIONER

## 2021-12-06 PROCEDURE — U0002: ICD-10-PCS | Mod: QW,S$GLB,, | Performed by: NURSE PRACTITIONER

## 2021-12-06 PROCEDURE — 99213 OFFICE O/P EST LOW 20 MIN: CPT | Mod: S$GLB,,, | Performed by: NURSE PRACTITIONER

## 2021-12-06 PROCEDURE — 87651 STREP A DNA AMP PROBE: CPT | Mod: QW,S$GLB,, | Performed by: NURSE PRACTITIONER

## 2021-12-06 PROCEDURE — U0002 COVID-19 LAB TEST NON-CDC: HCPCS | Mod: QW,S$GLB,, | Performed by: NURSE PRACTITIONER

## 2021-12-06 PROCEDURE — 99213 PR OFFICE/OUTPT VISIT, EST, LEVL III, 20-29 MIN: ICD-10-PCS | Mod: S$GLB,,, | Performed by: NURSE PRACTITIONER

## 2021-12-06 RX ORDER — FLUTICASONE PROPIONATE 50 MCG
1 SPRAY, SUSPENSION (ML) NASAL DAILY PRN
Qty: 16 G | Refills: 0 | Status: SHIPPED | OUTPATIENT
Start: 2021-12-06 | End: 2023-02-23

## 2021-12-14 DIAGNOSIS — E03.9 HYPOTHYROIDISM, UNSPECIFIED TYPE: ICD-10-CM

## 2021-12-15 RX ORDER — LEVOTHYROXINE SODIUM 75 UG/1
75 TABLET ORAL
Qty: 30 TABLET | Refills: 3 | Status: SHIPPED | OUTPATIENT
Start: 2021-12-15 | End: 2022-03-15 | Stop reason: SDUPTHER

## 2022-01-26 ENCOUNTER — OFFICE VISIT (OUTPATIENT)
Dept: URGENT CARE | Facility: CLINIC | Age: 27
End: 2022-01-26
Payer: COMMERCIAL

## 2022-01-26 VITALS
OXYGEN SATURATION: 98 % | HEART RATE: 81 BPM | HEIGHT: 60 IN | BODY MASS INDEX: 35.53 KG/M2 | RESPIRATION RATE: 18 BRPM | TEMPERATURE: 99 F | WEIGHT: 181 LBS | SYSTOLIC BLOOD PRESSURE: 100 MMHG | DIASTOLIC BLOOD PRESSURE: 70 MMHG

## 2022-01-26 DIAGNOSIS — B00.1 HERPES LABIALIS: Primary | ICD-10-CM

## 2022-01-26 DIAGNOSIS — I88.9 LYMPHADENITIS: ICD-10-CM

## 2022-01-26 PROCEDURE — 3008F PR BODY MASS INDEX (BMI) DOCUMENTED: ICD-10-PCS | Mod: CPTII,S$GLB,, | Performed by: PHYSICIAN ASSISTANT

## 2022-01-26 PROCEDURE — 1160F RVW MEDS BY RX/DR IN RCRD: CPT | Mod: CPTII,S$GLB,, | Performed by: PHYSICIAN ASSISTANT

## 2022-01-26 PROCEDURE — 1159F MED LIST DOCD IN RCRD: CPT | Mod: CPTII,S$GLB,, | Performed by: PHYSICIAN ASSISTANT

## 2022-01-26 PROCEDURE — 3074F PR MOST RECENT SYSTOLIC BLOOD PRESSURE < 130 MM HG: ICD-10-PCS | Mod: CPTII,S$GLB,, | Performed by: PHYSICIAN ASSISTANT

## 2022-01-26 PROCEDURE — 3074F SYST BP LT 130 MM HG: CPT | Mod: CPTII,S$GLB,, | Performed by: PHYSICIAN ASSISTANT

## 2022-01-26 PROCEDURE — 99213 PR OFFICE/OUTPT VISIT, EST, LEVL III, 20-29 MIN: ICD-10-PCS | Mod: S$GLB,,, | Performed by: PHYSICIAN ASSISTANT

## 2022-01-26 PROCEDURE — 3008F BODY MASS INDEX DOCD: CPT | Mod: CPTII,S$GLB,, | Performed by: PHYSICIAN ASSISTANT

## 2022-01-26 PROCEDURE — 3078F DIAST BP <80 MM HG: CPT | Mod: CPTII,S$GLB,, | Performed by: PHYSICIAN ASSISTANT

## 2022-01-26 PROCEDURE — 3078F PR MOST RECENT DIASTOLIC BLOOD PRESSURE < 80 MM HG: ICD-10-PCS | Mod: CPTII,S$GLB,, | Performed by: PHYSICIAN ASSISTANT

## 2022-01-26 PROCEDURE — 1159F PR MEDICATION LIST DOCUMENTED IN MEDICAL RECORD: ICD-10-PCS | Mod: CPTII,S$GLB,, | Performed by: PHYSICIAN ASSISTANT

## 2022-01-26 PROCEDURE — 1160F PR REVIEW ALL MEDS BY PRESCRIBER/CLIN PHARMACIST DOCUMENTED: ICD-10-PCS | Mod: CPTII,S$GLB,, | Performed by: PHYSICIAN ASSISTANT

## 2022-01-26 PROCEDURE — 99213 OFFICE O/P EST LOW 20 MIN: CPT | Mod: S$GLB,,, | Performed by: PHYSICIAN ASSISTANT

## 2022-01-26 RX ORDER — VALACYCLOVIR HYDROCHLORIDE 1 G/1
1000 TABLET, FILM COATED ORAL 3 TIMES DAILY
Qty: 21 TABLET | Refills: 0 | Status: SHIPPED | OUTPATIENT
Start: 2022-01-26 | End: 2022-02-02

## 2022-01-26 RX ORDER — DEXAMETHASONE 1 MG/1
TABLET ORAL
COMMUNITY
Start: 2021-09-23 | End: 2023-02-23

## 2022-01-26 RX ORDER — CEPHALEXIN 500 MG/1
500 TABLET ORAL 4 TIMES DAILY
Qty: 28 TABLET | Refills: 0 | Status: SHIPPED | OUTPATIENT
Start: 2022-01-26 | End: 2022-02-02

## 2022-01-26 NOTE — PATIENT INSTRUCTIONS
Patient Education       Lymphadenitis   About this topic   Lymphadenitis is another word for a swollen lymph node. A lymph node is a pea-shaped part of the lymph system that helps defend our body from germs. You have lymph nodes all over your body. They help trap foreign material and other cells that do not belong inside the body.  A swollen lymph node may mean there is an infection or disease in the body. Treatment for this condition depends on the cause. Most often, you will need drugs to treat your illness.  What are the causes?   Lymph nodes can become swollen if you have an infection, some other swelling, or even because of a tumor. If you have an infection in a lymph node it is most often because there is an infection somewhere else in your body. Some drugs or illnesses can cause you to have swollen lymph nodes.  What are the main signs?   Your lymph node may be:  · Swollen  · Sore  · Filled with pus  · Draining fluid  · Reddened skin over your lymph node  · Fever  · Warm to the touch  You may have problems with lymph nodes on one or both sides of your body.  How does the doctor diagnose this health problem?   The doctor will ask you questions about your health history and do an exam. The doctor will feel your lymph nodes and compare them to your other lymph nodes. Your doctor may order:  · Lab tests  · Ultrasound  · Biopsy  How does the doctor treat this health problem?   Your care is based on what is causing your swollen lymph nodes. In some cases, you may not need any treatment. In very serious cases, you may need surgery to drain an infection.  What drugs may be needed?   The doctor may order drugs to:  · Help with pain and swelling  · Fight an infection  · Lower fever  Last Reviewed Date   2021-07-19  Consumer Information Use and Disclaimer   This information is not specific medical advice and does not replace information you receive from your health care provider. This is only a brief summary of general  information. It does NOT include all information about conditions, illnesses, injuries, tests, procedures, treatments, therapies, discharge instructions or life-style choices that may apply to you. You must talk with your health care provider for complete information about your health and treatment options. This information should not be used to decide whether or not to accept your health care providers advice, instructions or recommendations. Only your health care provider has the knowledge and training to provide advice that is right for you.  Copyright   Copyright © 2021 [employer] and its affiliates and/or licensors. All rights reserved.  Patient Education       Cold Sores ED   General Information   You came to the Emergency Department (ED) for cold sores. These blisters on or near your lips or inside your mouth are caused by the herpes simplex virus. Some people also call them fever blisters. It is easy to spread this virus from person to person.  Cold sores will often go away without treatment in a week or two. However, the doctors may have ordered medicine to help your sores heal faster. Sometimes cold sores heal and then come back again. When they come back, they might not last as long.  What care is needed at home?   · Call your regular doctor to let them know you were in the ED. Make a follow-up appointment if you were told to.  · If the doctor ordered medicine to treat the sores, take it as ordered.  · There are things you can do to lower the chance of spreading the virus that causes cold sores. This is especially important while you have cold sores, but its possible to pass the infection to other people even when you dont have sores. To lower the risk of spread, when you have cold sores do not:  ? Kiss anyone.  ? Share utensils, glasses, water bottles, towels, lip balm, or razors.  ? Give anyone oral sex.  · To help with pain, try sucking on ice or a popsicle.  · You may want to take medicines  like ibuprofen, naproxen, or acetaminophen to help with pain.  When do I need to call the doctor?   · You have a fever of 100.4°F (38°C) or higher or chills.  · Your cold sore is swollen, red, or warm or has yellowish, greenish, or bloody discharge.  · You have trouble swallowing or cannot eat or drink anything.  · Your cold sores do not go away within a week.  · You have new or worsening symptoms.  Last Reviewed Date   2021-04-08  Consumer Information Use and Disclaimer   This information is not specific medical advice and does not replace information you receive from your health care provider. This is only a brief summary of general information. It does NOT include all information about conditions, illnesses, injuries, tests, procedures, treatments, therapies, discharge instructions or life-style choices that may apply to you. You must talk with your health care provider for complete information about your health and treatment options. This information should not be used to decide whether or not to accept your health care providers advice, instructions or recommendations. Only your health care provider has the knowledge and training to provide advice that is right for you.  Copyright   Copyright © 2021 [employer] and its affiliates and/or licensors. All rights reserved.

## 2022-01-26 NOTE — PROGRESS NOTES
Subjective:       Patient ID: Jolly Whyte is a 26 y.o. female.    Vitals:  height is 5' (1.524 m) and weight is 82.1 kg (181 lb). Her oral temperature is 98.6 °F (37 °C). Her blood pressure is 100/70 and her pulse is 81. Her respiration is 18 and oxygen saturation is 98%.     Chief Complaint: Mass    Pt presents at urgent care for painful lump on back of left ear that has been there for the past 2 days.   Pt denies any injuries  Pt has tried ibuprofren & ice. Also c/o fever blisters getting worse despite otc meds     Mass  This is a new problem. The current episode started in the past 7 days. The problem occurs constantly. The problem has been gradually worsening. Associated symptoms include a rash and swollen glands. She has tried acetaminophen and ice for the symptoms. The treatment provided no relief.       Skin: Positive for rash and erythema.       Objective:      Physical Exam   Constitutional: She is oriented to person, place, and time. She appears well-developed and well-nourished. She is cooperative.  Non-toxic appearance. She does not have a sickly appearance. She does not appear ill. No distress.   HENT:   Head: Normocephalic and atraumatic.   Ears:   Right Ear: Hearing, tympanic membrane, external ear and ear canal normal.   Left Ear: Hearing, tympanic membrane, external ear and ear canal normal.      Comments: Left post auricular node small slightly TTP   Nose: Nose normal. No mucosal edema, rhinorrhea, nasal deformity or congestion. No epistaxis. Right sinus exhibits no maxillary sinus tenderness and no frontal sinus tenderness. Left sinus exhibits no maxillary sinus tenderness and no frontal sinus tenderness.   Mouth/Throat: Uvula is midline, oropharynx is clear and moist and mucous membranes are normal. No trismus in the jaw. Normal dentition. No uvula swelling. No oropharyngeal exudate, posterior oropharyngeal edema or posterior oropharyngeal erythema.      Comments: Small blister/ vesicles and  some areas of crusting on upper lip. No nose involvement  Eyes: Conjunctivae and lids are normal. No scleral icterus.   Neck: Trachea normal and phonation normal. Neck supple. No edema present. No erythema present. No neck rigidity present.   Cardiovascular: Normal rate, regular rhythm, normal heart sounds, intact distal pulses and normal pulses.   Pulmonary/Chest: Effort normal and breath sounds normal. No respiratory distress. She has no decreased breath sounds. She has no wheezes. She has no rhonchi. She has no rales.   Abdominal: Normal appearance. Soft. flat abdomen  Musculoskeletal: Normal range of motion.         General: No deformity or edema. Normal range of motion.   Neurological: She is alert and oriented to person, place, and time. She exhibits normal muscle tone. Coordination normal.   Skin: Skin is warm, dry, intact, not diaphoretic and not pale. Capillary refill takes less than 2 seconds. erythema   Psychiatric: She has a normal mood and affect. Her speech is normal and behavior is normal. Judgment and thought content normal. Cognition and memory  Nursing note and vitals reviewed.        Assessment:       1. Herpes labialis    2. Lymphadenitis          Plan:         Herpes labialis  -     valACYclovir (VALTREX) 1000 MG tablet; Take 1 tablet (1,000 mg total) by mouth 3 (three) times daily. for 7 days  Dispense: 21 tablet; Refill: 0    Lymphadenitis  -     cephalexin (KEFTAB) 500 mg tablet; Take 1 tablet (500 mg total) by mouth 4 (four) times daily. for 7 days  Dispense: 28 tablet; Refill: 0     No follow-ups on file.   Patient Instructions   Patient Education       Lymphadenitis   About this topic   Lymphadenitis is another word for a swollen lymph node. A lymph node is a pea-shaped part of the lymph system that helps defend our body from germs. You have lymph nodes all over your body. They help trap foreign material and other cells that do not belong inside the body.  A swollen lymph node may mean  there is an infection or disease in the body. Treatment for this condition depends on the cause. Most often, you will need drugs to treat your illness.  What are the causes?   Lymph nodes can become swollen if you have an infection, some other swelling, or even because of a tumor. If you have an infection in a lymph node it is most often because there is an infection somewhere else in your body. Some drugs or illnesses can cause you to have swollen lymph nodes.  What are the main signs?   Your lymph node may be:  · Swollen  · Sore  · Filled with pus  · Draining fluid  · Reddened skin over your lymph node  · Fever  · Warm to the touch  You may have problems with lymph nodes on one or both sides of your body.  How does the doctor diagnose this health problem?   The doctor will ask you questions about your health history and do an exam. The doctor will feel your lymph nodes and compare them to your other lymph nodes. Your doctor may order:  · Lab tests  · Ultrasound  · Biopsy  How does the doctor treat this health problem?   Your care is based on what is causing your swollen lymph nodes. In some cases, you may not need any treatment. In very serious cases, you may need surgery to drain an infection.  What drugs may be needed?   The doctor may order drugs to:  · Help with pain and swelling  · Fight an infection  · Lower fever  Last Reviewed Date   2021-07-19  Consumer Information Use and Disclaimer   This information is not specific medical advice and does not replace information you receive from your health care provider. This is only a brief summary of general information. It does NOT include all information about conditions, illnesses, injuries, tests, procedures, treatments, therapies, discharge instructions or life-style choices that may apply to you. You must talk with your health care provider for complete information about your health and treatment options. This information should not be used to decide whether  or not to accept your health care providers advice, instructions or recommendations. Only your health care provider has the knowledge and training to provide advice that is right for you.  Copyright   Copyright © 2021 [employer] and its affiliates and/or licensors. All rights reserved.  Patient Education       Cold Sores ED   General Information   You came to the Emergency Department (ED) for cold sores. These blisters on or near your lips or inside your mouth are caused by the herpes simplex virus. Some people also call them fever blisters. It is easy to spread this virus from person to person.  Cold sores will often go away without treatment in a week or two. However, the doctors may have ordered medicine to help your sores heal faster. Sometimes cold sores heal and then come back again. When they come back, they might not last as long.  What care is needed at home?   · Call your regular doctor to let them know you were in the ED. Make a follow-up appointment if you were told to.  · If the doctor ordered medicine to treat the sores, take it as ordered.  · There are things you can do to lower the chance of spreading the virus that causes cold sores. This is especially important while you have cold sores, but its possible to pass the infection to other people even when you dont have sores. To lower the risk of spread, when you have cold sores do not:  ? Kiss anyone.  ? Share utensils, glasses, water bottles, towels, lip balm, or razors.  ? Give anyone oral sex.  · To help with pain, try sucking on ice or a popsicle.  · You may want to take medicines like ibuprofen, naproxen, or acetaminophen to help with pain.  When do I need to call the doctor?   · You have a fever of 100.4°F (38°C) or higher or chills.  · Your cold sore is swollen, red, or warm or has yellowish, greenish, or bloody discharge.  · You have trouble swallowing or cannot eat or drink anything.  · Your cold sores do not go away within a  week.  · You have new or worsening symptoms.  Last Reviewed Date   2021-04-08  Consumer Information Use and Disclaimer   This information is not specific medical advice and does not replace information you receive from your health care provider. This is only a brief summary of general information. It does NOT include all information about conditions, illnesses, injuries, tests, procedures, treatments, therapies, discharge instructions or life-style choices that may apply to you. You must talk with your health care provider for complete information about your health and treatment options. This information should not be used to decide whether or not to accept your health care providers advice, instructions or recommendations. Only your health care provider has the knowledge and training to provide advice that is right for you.  Copyright   Copyright © 2021 [employer] and its affiliates and/or licensors. All rights reserved.

## 2022-03-15 DIAGNOSIS — E03.9 HYPOTHYROIDISM, UNSPECIFIED TYPE: ICD-10-CM

## 2022-03-16 RX ORDER — LEVOTHYROXINE SODIUM 75 UG/1
75 TABLET ORAL
Qty: 30 TABLET | Refills: 3 | Status: SHIPPED | OUTPATIENT
Start: 2022-03-16 | End: 2022-06-15 | Stop reason: SDUPTHER

## 2022-06-20 ENCOUNTER — OFFICE VISIT (OUTPATIENT)
Dept: URGENT CARE | Facility: CLINIC | Age: 27
End: 2022-06-20
Payer: COMMERCIAL

## 2022-06-20 VITALS
HEART RATE: 116 BPM | DIASTOLIC BLOOD PRESSURE: 81 MMHG | HEIGHT: 60 IN | RESPIRATION RATE: 18 BRPM | WEIGHT: 198 LBS | OXYGEN SATURATION: 97 % | SYSTOLIC BLOOD PRESSURE: 119 MMHG | TEMPERATURE: 100 F | BODY MASS INDEX: 38.87 KG/M2

## 2022-06-20 DIAGNOSIS — U07.1 COVID-19 VIRUS INFECTION: Primary | ICD-10-CM

## 2022-06-20 LAB
CTP QC/QA: YES
SARS-COV-2 RDRP RESP QL NAA+PROBE: POSITIVE

## 2022-06-20 PROCEDURE — 3074F PR MOST RECENT SYSTOLIC BLOOD PRESSURE < 130 MM HG: ICD-10-PCS | Mod: CPTII,S$GLB,, | Performed by: FAMILY MEDICINE

## 2022-06-20 PROCEDURE — 3074F SYST BP LT 130 MM HG: CPT | Mod: CPTII,S$GLB,, | Performed by: FAMILY MEDICINE

## 2022-06-20 PROCEDURE — 99214 OFFICE O/P EST MOD 30 MIN: CPT | Mod: S$GLB,,, | Performed by: FAMILY MEDICINE

## 2022-06-20 PROCEDURE — 3079F PR MOST RECENT DIASTOLIC BLOOD PRESSURE 80-89 MM HG: ICD-10-PCS | Mod: CPTII,S$GLB,, | Performed by: FAMILY MEDICINE

## 2022-06-20 PROCEDURE — 1159F MED LIST DOCD IN RCRD: CPT | Mod: CPTII,S$GLB,, | Performed by: FAMILY MEDICINE

## 2022-06-20 PROCEDURE — 99214 PR OFFICE/OUTPT VISIT, EST, LEVL IV, 30-39 MIN: ICD-10-PCS | Mod: S$GLB,,, | Performed by: FAMILY MEDICINE

## 2022-06-20 PROCEDURE — 3008F PR BODY MASS INDEX (BMI) DOCUMENTED: ICD-10-PCS | Mod: CPTII,S$GLB,, | Performed by: FAMILY MEDICINE

## 2022-06-20 PROCEDURE — U0002 COVID-19 LAB TEST NON-CDC: HCPCS | Mod: QW,S$GLB,, | Performed by: FAMILY MEDICINE

## 2022-06-20 PROCEDURE — 3079F DIAST BP 80-89 MM HG: CPT | Mod: CPTII,S$GLB,, | Performed by: FAMILY MEDICINE

## 2022-06-20 PROCEDURE — 1160F PR REVIEW ALL MEDS BY PRESCRIBER/CLIN PHARMACIST DOCUMENTED: ICD-10-PCS | Mod: CPTII,S$GLB,, | Performed by: FAMILY MEDICINE

## 2022-06-20 PROCEDURE — U0002: ICD-10-PCS | Mod: QW,S$GLB,, | Performed by: FAMILY MEDICINE

## 2022-06-20 PROCEDURE — 1159F PR MEDICATION LIST DOCUMENTED IN MEDICAL RECORD: ICD-10-PCS | Mod: CPTII,S$GLB,, | Performed by: FAMILY MEDICINE

## 2022-06-20 PROCEDURE — 1160F RVW MEDS BY RX/DR IN RCRD: CPT | Mod: CPTII,S$GLB,, | Performed by: FAMILY MEDICINE

## 2022-06-20 PROCEDURE — 3008F BODY MASS INDEX DOCD: CPT | Mod: CPTII,S$GLB,, | Performed by: FAMILY MEDICINE

## 2022-06-20 NOTE — PROGRESS NOTES
Subjective:       Patient ID: Jolly Whyte is a 27 y.o. female.    Vitals:  height is 5' (1.524 m) and weight is 89.8 kg (198 lb). Her oral temperature is 99.5 °F (37.5 °C). Her blood pressure is 119/81 and her pulse is 116 (abnormal). Her respiration is 18 and oxygen saturation is 97%.     Chief Complaint: URI    Pt presents to urgent care for Sore throat, fever, chills, body aches & headache since yesterday.  Pt has been taking Tylenol    URI   This is a new problem. The current episode started yesterday. The problem has been gradually worsening. The fever has been present for less than 1 day. Associated symptoms include congestion, coughing, ear pain, headaches, a plugged ear sensation and a sore throat. She has tried acetaminophen for the symptoms. The treatment provided no relief.       HENT: Positive for ear pain, congestion and sore throat.    Respiratory: Positive for cough.    Neurological: Positive for headaches.       Objective:      Physical Exam   Constitutional:  Non-toxic appearance. She does not appear ill. No distress. obesity  HENT:   Head: Normocephalic and atraumatic.   Nose: Congestion present.   Cardiovascular: Normal rate, regular rhythm, normal heart sounds and normal pulses.   Pulmonary/Chest: Effort normal and breath sounds normal.   Abdominal: Normal appearance.   Neurological: She is alert.   Nursing note and vitals reviewed.    Results for orders placed or performed in visit on 06/20/22   POCT COVID-19 Rapid Screening   Result Value Ref Range    POC Rapid COVID Positive (A) Negative     Acceptable Yes          Assessment:       1. COVID-19 virus infection          Plan:         COVID-19 virus infection  -     POCT COVID-19 Rapid Screening    discussed symptom monitoing, contact notification and isolation X 5 days. Reviewed possible benefits of aspirin, vitamin C, D and zinc. Not a candidate for Paxlovid or infusion therapy based on risk assessment. Verbalized  understanding and agreement

## 2022-12-03 ENCOUNTER — PATIENT MESSAGE (OUTPATIENT)
Dept: ENDOCRINOLOGY | Facility: CLINIC | Age: 27
End: 2022-12-03
Payer: COMMERCIAL

## 2022-12-05 DIAGNOSIS — E03.9 HYPOTHYROIDISM, UNSPECIFIED TYPE: ICD-10-CM

## 2022-12-05 RX ORDER — LEVOTHYROXINE SODIUM 75 UG/1
75 TABLET ORAL
Qty: 30 TABLET | Refills: 1 | Status: SHIPPED | OUTPATIENT
Start: 2022-12-05 | End: 2023-01-02

## 2023-01-27 ENCOUNTER — PATIENT MESSAGE (OUTPATIENT)
Dept: RESEARCH | Facility: HOSPITAL | Age: 28
End: 2023-01-27
Payer: COMMERCIAL

## 2023-02-16 NOTE — PROGRESS NOTES
Subjective:      Patient ID: Jolly Whyte is a 27 y.o. female.    Chief Complaint:  No chief complaint on file.    History of Present Illness  Jolly Whyte is here for follow up of hypothyroidism.  Previously seen by me 2021.  This is a MyChart video visit.    The patient location is: [place]  The chief complaint leading to consultation is: hypothyroid  Visit type: Virtual visit with synchronous audio and video  Total time spent with patient: see below   Each patient to whom he or she provides medical services by telemedicine is:  (1) informed of the relationship between the physician and patient and the respective role of any other health care provider with respect to management of the patient; and (2) notified that he or she may decline to receive medical services by telemedicine and may withdraw from such care at any time.    Pituitary Adenoma HPI    5/2015  MRI report 4 x 2 mm pituitary microadenoma     3/2016  MRI 4x2mm stable pituitary microadenoma    2/2017  MRI impression- stable appearance    2019  Functional Workup- prolactin, IGF1, TFTs unremarkable    2021  DMST unremarkable      Reports regular menstrual cycles every 28 days that lasts for 5-7 days.  Not on OCPs. Reports plans for fertility.     Reports increased appetite, diarrhea, weight gain, fatigue, hair loss, brittle nails, heat intolerance, palpitations (exertion), snoring.  Denies constipation, CP, SOB.    Denies history of HTN or diabetes.    Denies anorexia/nausea/vomiting, orthostatic symptoms.    Denies facial edema/erythema, hirsutism/acne/voice deepening, truncal obesity, kidney stones/hematuria.    Reports easy bruising, red/purple striae on inner arms and abdomen.     Denies galactorrhea, polyuria/polydipsia, unusual headaches, peripheral vision loss.     With regards to hypothyroidism:    Diagnosed 10/2019.     Ref. Range 3/16/2020 10:26   Thyroid Peroxidase Ab Latest Ref Range: <9 IU/mL 814 (H)       Lab Results   Component Value Date     TSH 1.586 09/23/2021    THYROIDAB 814 (H) 03/16/2020    FREET4 1.02 09/23/2021     Current Medication:  Levothyroxine 75mcg daily     Takes thyroid medication properly without food first thing in the morning.    Current Symptoms:   As above.     Biotin Use: patient is unsure.     Plans for fertility: Yes.    With regards to Vitamin D Deficiency:    Vit D, 25-Hydroxy   Date Value Ref Range Status   09/23/2021 24 (L) 30 - 96 ng/mL Final     Comment:     Vitamin D deficiency.........<10 ng/mL                              Vitamin D insufficiency......10-29 ng/mL       Vitamin D sufficiency........> or equal to 30 ng/mL  Vitamin D toxicity............>100 ng/mL         Current Meds: None.       Review of Systems  As above        There were no vitals taken for this visit.      There is no height or weight on file to calculate BMI.    Lab Review:   No results found for: HGBA1C    Lab Results   Component Value Date    CHOL 184 08/06/2018    HDL 51 08/06/2018    LDLCALC 117 (H) 08/06/2018    TRIG 70 08/06/2018    CHOLHDL 3.6 08/06/2018     Lab Results   Component Value Date     09/23/2021    K 3.8 09/23/2021     09/23/2021    CO2 25 09/23/2021    GLU 77 09/23/2021    BUN 11 09/23/2021    CREATININE 0.7 09/23/2021    CALCIUM 9.8 09/23/2021    PROT 7.0 09/23/2021    ALBUMIN 4.1 09/23/2021    BILITOT 0.5 09/23/2021    ALKPHOS 75 09/23/2021    AST 24 09/23/2021    ALT 34 09/23/2021    ANIONGAP 11 09/23/2021    ESTGFRAFRICA >60.0 09/23/2021    EGFRNONAA >60.0 09/23/2021    TSH 1.586 09/23/2021     Vit D, 25-Hydroxy   Date Value Ref Range Status   09/23/2021 24 (L) 30 - 96 ng/mL Final     Comment:     Vitamin D deficiency.........<10 ng/mL                              Vitamin D insufficiency......10-29 ng/mL       Vitamin D sufficiency........> or equal to 30 ng/mL  Vitamin D toxicity............>100 ng/mL       Assessment and Plan     1. Hashimoto's disease  TSH    T4, Free    Comprehensive Metabolic Panel      2.  Pituitary microadenoma  Prolactin      3. Vitamin D deficiency  Vitamin D          Hashimoto's disease  -- Labs now.  -- Medication Changes:   Levothyroxine 75mcg daily  -- Goal is a normal TSH.  -- Avoid exogenous hyperthyroidism as this can accelerate bone loss and increase risk of CV complications.  -- Advised to take LT4 on an empty stomach with water and to wait 30-45 minutes before eating or taking other medications   -- Reviewed usual times of thyroid hormone changes  -- Reviewed that symptoms of hypothyroidism may not correlate with tsh, and a normal TSH is the goal of therapy. Symptoms are not a justification for over treatment.  -- Reviewed that the patient must call if she starts/stops OCPs or becomes pregnant because of the probable required change in LT4 dosage that will occur.     Pituitary microadenoma  -- Pituitary microadenoma stable on imaging - 2015, 2016, 2017.  -- Functional Workup 2019 -  prolactin, IGF1, TFTs, DMST unremarkable.  -- Will repeat Prolactin given plans for fertility.     Vitamin D deficiency  -- Check vitamin D.      No follow-ups on file.      I spent 20 minutes face-to-face with the patient, over half of the visit was spent on counseling and/or coordinating the care of the patient.    Counseling includes:  Diagnostic results, impressions, recommendations   Prognosis   Risk and benefits of management/treatment options   Instructions for management treatment and or follow-up   Importance of compliance with management   Risk factor reduction   Patient education

## 2023-02-23 ENCOUNTER — OFFICE VISIT (OUTPATIENT)
Dept: ENDOCRINOLOGY | Facility: CLINIC | Age: 28
End: 2023-02-23
Payer: COMMERCIAL

## 2023-02-23 DIAGNOSIS — D35.2 PITUITARY MICROADENOMA: ICD-10-CM

## 2023-02-23 DIAGNOSIS — E55.9 VITAMIN D DEFICIENCY: ICD-10-CM

## 2023-02-23 DIAGNOSIS — E06.3 HASHIMOTO'S DISEASE: Primary | ICD-10-CM

## 2023-02-23 PROCEDURE — 1160F PR REVIEW ALL MEDS BY PRESCRIBER/CLIN PHARMACIST DOCUMENTED: ICD-10-PCS | Mod: CPTII,95,, | Performed by: NURSE PRACTITIONER

## 2023-02-23 PROCEDURE — 1159F PR MEDICATION LIST DOCUMENTED IN MEDICAL RECORD: ICD-10-PCS | Mod: CPTII,95,, | Performed by: NURSE PRACTITIONER

## 2023-02-23 PROCEDURE — 99214 OFFICE O/P EST MOD 30 MIN: CPT | Mod: 95,,, | Performed by: NURSE PRACTITIONER

## 2023-02-23 PROCEDURE — 99214 PR OFFICE/OUTPT VISIT, EST, LEVL IV, 30-39 MIN: ICD-10-PCS | Mod: 95,,, | Performed by: NURSE PRACTITIONER

## 2023-02-23 PROCEDURE — 1159F MED LIST DOCD IN RCRD: CPT | Mod: CPTII,95,, | Performed by: NURSE PRACTITIONER

## 2023-02-23 PROCEDURE — 1160F RVW MEDS BY RX/DR IN RCRD: CPT | Mod: CPTII,95,, | Performed by: NURSE PRACTITIONER

## 2023-02-23 NOTE — Clinical Note
Labs now RTC 1 year Orders Placed This Encounter     TSH     T4, Free     Comprehensive Metabolic Panel     Vitamin D     Prolactin

## 2023-02-23 NOTE — ASSESSMENT & PLAN NOTE
-- Pituitary microadenoma stable on imaging - 2015, 2016, 2017.  -- Functional Workup 2019 -  prolactin, IGF1, TFTs, DMST unremarkable.  -- Will repeat Prolactin given plans for fertility.

## 2023-02-23 NOTE — ASSESSMENT & PLAN NOTE
-- Labs now.  -- Medication Changes:   Levothyroxine 75mcg daily  -- Goal is a normal TSH.  -- Avoid exogenous hyperthyroidism as this can accelerate bone loss and increase risk of CV complications.  -- Advised to take LT4 on an empty stomach with water and to wait 30-45 minutes before eating or taking other medications   -- Reviewed usual times of thyroid hormone changes  -- Reviewed that symptoms of hypothyroidism may not correlate with tsh, and a normal TSH is the goal of therapy. Symptoms are not a justification for over treatment.  -- Reviewed that the patient must call if she starts/stops OCPs or becomes pregnant because of the probable required change in LT4 dosage that will occur.

## 2023-02-24 ENCOUNTER — LAB VISIT (OUTPATIENT)
Dept: LAB | Facility: HOSPITAL | Age: 28
End: 2023-02-24
Attending: NURSE PRACTITIONER
Payer: COMMERCIAL

## 2023-02-24 DIAGNOSIS — E06.3 HASHIMOTO'S DISEASE: ICD-10-CM

## 2023-02-24 DIAGNOSIS — D35.2 PITUITARY MICROADENOMA: ICD-10-CM

## 2023-02-24 DIAGNOSIS — E55.9 VITAMIN D DEFICIENCY: ICD-10-CM

## 2023-02-24 LAB
25(OH)D3+25(OH)D2 SERPL-MCNC: 14 NG/ML (ref 30–96)
ALBUMIN SERPL BCP-MCNC: 4.1 G/DL (ref 3.5–5.2)
ALP SERPL-CCNC: 72 U/L (ref 55–135)
ALT SERPL W/O P-5'-P-CCNC: 65 U/L (ref 10–44)
ANION GAP SERPL CALC-SCNC: 8 MMOL/L (ref 8–16)
AST SERPL-CCNC: 35 U/L (ref 10–40)
BILIRUB SERPL-MCNC: 0.5 MG/DL (ref 0.1–1)
BUN SERPL-MCNC: 12 MG/DL (ref 6–20)
CALCIUM SERPL-MCNC: 9.2 MG/DL (ref 8.7–10.5)
CHLORIDE SERPL-SCNC: 105 MMOL/L (ref 95–110)
CO2 SERPL-SCNC: 25 MMOL/L (ref 23–29)
CREAT SERPL-MCNC: 0.6 MG/DL (ref 0.5–1.4)
EST. GFR  (NO RACE VARIABLE): >60 ML/MIN/1.73 M^2
GLUCOSE SERPL-MCNC: 86 MG/DL (ref 70–110)
POTASSIUM SERPL-SCNC: 3.5 MMOL/L (ref 3.5–5.1)
PROT SERPL-MCNC: 6.9 G/DL (ref 6–8.4)
SODIUM SERPL-SCNC: 138 MMOL/L (ref 136–145)
T4 FREE SERPL-MCNC: 1.04 NG/DL (ref 0.71–1.51)
TSH SERPL DL<=0.005 MIU/L-ACNC: 2.61 UIU/ML (ref 0.34–5.6)

## 2023-02-24 PROCEDURE — 80053 COMPREHEN METABOLIC PANEL: CPT | Performed by: NURSE PRACTITIONER

## 2023-02-24 PROCEDURE — 84439 ASSAY OF FREE THYROXINE: CPT | Performed by: NURSE PRACTITIONER

## 2023-02-24 PROCEDURE — 84146 ASSAY OF PROLACTIN: CPT | Performed by: NURSE PRACTITIONER

## 2023-02-24 PROCEDURE — 84443 ASSAY THYROID STIM HORMONE: CPT | Performed by: NURSE PRACTITIONER

## 2023-02-24 PROCEDURE — 82306 VITAMIN D 25 HYDROXY: CPT | Performed by: NURSE PRACTITIONER

## 2023-02-24 PROCEDURE — 36415 COLL VENOUS BLD VENIPUNCTURE: CPT | Performed by: NURSE PRACTITIONER

## 2023-02-25 LAB — PROLACTIN SERPL-MCNC: 12.3 NG/ML (ref 4.8–23.3)

## 2023-02-27 ENCOUNTER — PATIENT MESSAGE (OUTPATIENT)
Dept: ENDOCRINOLOGY | Facility: CLINIC | Age: 28
End: 2023-02-27
Payer: COMMERCIAL

## 2023-02-27 DIAGNOSIS — E06.3 HASHIMOTO'S DISEASE: Primary | ICD-10-CM

## 2023-02-27 DIAGNOSIS — E03.9 HYPOTHYROIDISM, UNSPECIFIED TYPE: ICD-10-CM

## 2023-02-27 RX ORDER — LEVOTHYROXINE SODIUM 75 UG/1
TABLET ORAL
Qty: 32 TABLET | Refills: 11 | Status: SHIPPED | OUTPATIENT
Start: 2023-02-27 | End: 2023-03-01

## 2023-02-27 NOTE — TELEPHONE ENCOUNTER
Component      Latest Ref Rng & Units 2/24/2023   Sodium      136 - 145 mmol/L 138   Potassium      3.5 - 5.1 mmol/L 3.5   Chloride      95 - 110 mmol/L 105   CO2      23 - 29 mmol/L 25   Glucose      70 - 110 mg/dL 86   BUN      6 - 20 mg/dL 12   Creatinine      0.5 - 1.4 mg/dL 0.6   Calcium      8.7 - 10.5 mg/dL 9.2   PROTEIN TOTAL      6.0 - 8.4 g/dL 6.9   Albumin      3.5 - 5.2 g/dL 4.1   BILIRUBIN TOTAL      0.1 - 1.0 mg/dL 0.5   Alkaline Phosphatase      55 - 135 U/L 72   AST      10 - 40 U/L 35   ALT      10 - 44 U/L 65 (H)   Anion Gap      8 - 16 mmol/L 8   eGFR      >60 mL/min/1.73 m:2 >60.0   TSH      0.340 - 5.600 uIU/mL 2.610   Free T4      0.71 - 1.51 ng/dL 1.04   Vit D, 25-Hydroxy      30 - 96 ng/mL 14 (L)   Prolactin      4.8 - 23.3 ng/mL 12.3

## 2023-04-11 ENCOUNTER — OFFICE VISIT (OUTPATIENT)
Dept: URGENT CARE | Facility: CLINIC | Age: 28
End: 2023-04-11
Payer: COMMERCIAL

## 2023-04-11 VITALS
TEMPERATURE: 99 F | SYSTOLIC BLOOD PRESSURE: 112 MMHG | OXYGEN SATURATION: 98 % | DIASTOLIC BLOOD PRESSURE: 67 MMHG | WEIGHT: 198 LBS | BODY MASS INDEX: 38.87 KG/M2 | RESPIRATION RATE: 19 BRPM | HEIGHT: 60 IN | HEART RATE: 68 BPM

## 2023-04-11 DIAGNOSIS — J02.9 ACUTE VIRAL PHARYNGITIS: Primary | ICD-10-CM

## 2023-04-11 DIAGNOSIS — J02.9 SORE THROAT: ICD-10-CM

## 2023-04-11 DIAGNOSIS — R05.9 COUGH, UNSPECIFIED TYPE: ICD-10-CM

## 2023-04-11 LAB
CTP QC/QA: YES
CTP QC/QA: YES
MOLECULAR STREP A: NEGATIVE
SARS-COV-2 AG RESP QL IA.RAPID: NEGATIVE

## 2023-04-11 PROCEDURE — 87651 STREP A DNA AMP PROBE: CPT | Mod: QW,S$GLB,, | Performed by: NURSE PRACTITIONER

## 2023-04-11 PROCEDURE — 87651 POCT STREP A MOLECULAR: ICD-10-PCS | Mod: QW,S$GLB,, | Performed by: NURSE PRACTITIONER

## 2023-04-11 PROCEDURE — 87811 SARS-COV-2 COVID19 W/OPTIC: CPT | Mod: QW,S$GLB,, | Performed by: NURSE PRACTITIONER

## 2023-04-11 PROCEDURE — 99214 PR OFFICE/OUTPT VISIT, EST, LEVL IV, 30-39 MIN: ICD-10-PCS | Mod: S$GLB,,, | Performed by: NURSE PRACTITIONER

## 2023-04-11 PROCEDURE — 99214 OFFICE O/P EST MOD 30 MIN: CPT | Mod: S$GLB,,, | Performed by: NURSE PRACTITIONER

## 2023-04-11 PROCEDURE — 87811 SARS CORONAVIRUS 2 ANTIGEN POCT, MANUAL READ: ICD-10-PCS | Mod: QW,S$GLB,, | Performed by: NURSE PRACTITIONER

## 2023-04-11 RX ORDER — PROMETHAZINE HYDROCHLORIDE AND DEXTROMETHORPHAN HYDROBROMIDE 6.25; 15 MG/5ML; MG/5ML
5 SYRUP ORAL NIGHTLY PRN
Qty: 120 ML | Refills: 0 | Status: SHIPPED | OUTPATIENT
Start: 2023-04-11 | End: 2023-04-21

## 2023-04-11 NOTE — PROGRESS NOTES
Subjective:      Patient ID: Jolly Whyte is a 27 y.o. female.    Vitals:  height is 5' (1.524 m) and weight is 89.8 kg (198 lb). Her temperature is 98.5 °F (36.9 °C). Her blood pressure is 112/67 and her pulse is 68. Her respiration is 19 and oxygen saturation is 98%.     Chief Complaint: Sore Throat    Pt present with symptoms of- Sore Throat, Bilateral Ear Pain, Cough and Congestion since yesterday.  Works at [employer] and came into contact w/ a patient who had strep.     Sore Throat   This is a new problem. The current episode started in the past 7 days. The problem has been gradually worsening. There has been no fever. The pain is at a severity of 7/10. The pain is moderate. Associated symptoms include congestion, coughing, ear pain and a hoarse voice. Pertinent negatives include no abdominal pain, diarrhea, drooling, ear discharge, headaches, plugged ear sensation, neck pain, shortness of breath, stridor, swollen glands, trouble swallowing or vomiting. She has had exposure to strep. She has tried acetaminophen for the symptoms. The treatment provided mild relief.     Constitution: Negative for chills, fatigue and fever.   HENT:  Positive for ear pain, congestion and sore throat. Negative for ear discharge, drooling and trouble swallowing.    Neck: Negative for neck pain.   Respiratory:  Positive for cough. Negative for shortness of breath and stridor.    Gastrointestinal:  Negative for abdominal pain, vomiting and diarrhea.   Neurological:  Negative for headaches.    Objective:     Physical Exam   Constitutional: She is oriented to person, place, and time. She appears well-developed. She is cooperative.  Non-toxic appearance. She does not appear ill. No distress.   HENT:   Head: Normocephalic and atraumatic.   Ears:   Right Ear: Hearing, tympanic membrane, external ear and ear canal normal.   Left Ear: Hearing, tympanic membrane, external ear and ear canal normal.   Nose: Mucosal edema present. No  rhinorrhea or nasal deformity. No epistaxis. Right sinus exhibits no maxillary sinus tenderness and no frontal sinus tenderness. Left sinus exhibits no maxillary sinus tenderness and no frontal sinus tenderness.   Mouth/Throat: Uvula is midline and mucous membranes are normal. No trismus in the jaw. Normal dentition. No uvula swelling. Posterior oropharyngeal erythema present. No oropharyngeal exudate or posterior oropharyngeal edema.   Bilateral tm scarred (consistent w/ hx of pe tubes)      Comments: Bilateral tm scarred (consistent w/ hx of pe tubes)  Eyes: Conjunctivae and lids are normal. No scleral icterus.   Neck: Trachea normal and phonation normal. Neck supple. No edema present. No erythema present. No neck rigidity present.   Cardiovascular: Normal rate, regular rhythm, normal heart sounds and normal pulses.   Pulmonary/Chest: Effort normal and breath sounds normal. No respiratory distress. She has no decreased breath sounds. She has no rhonchi.   Abdominal: Normal appearance.   Musculoskeletal: Normal range of motion.         General: No deformity. Normal range of motion.   Lymphadenopathy:     She has no cervical adenopathy.   Neurological: She is alert and oriented to person, place, and time. She exhibits normal muscle tone. Coordination normal.   Skin: Skin is warm, dry, intact, not diaphoretic and not pale.   Psychiatric: Her speech is normal and behavior is normal. Judgment and thought content normal.   Nursing note and vitals reviewed.    Results for orders placed or performed in visit on 04/11/23   SARS Coronavirus 2 Antigen, POCT Manual Read   Result Value Ref Range    SARS Coronavirus 2 Antigen Negative Negative     Acceptable Yes    POCT Strep A, Molecular   Result Value Ref Range    Molecular Strep A, POC Negative Negative     Acceptable Yes        Assessment:     1. Acute viral pharyngitis    2. Sore throat    3. Cough, unspecified type        Plan:   Lab  reviewed.    Acute viral pharyngitis  -     (Magic mouthwash) 1:1:1 diphenhydrAMINE(Benadryl) 12.5mg/5ml liq, aluminum & magnesium hydroxide-simethicone (Maalox), LIDOcaine viscous 2%; Swish and spit 10 mLs every 4 (four) hours as needed (sore throat).  Dispense: 180 mL; Refill: 0    Sore throat  -     SARS Coronavirus 2 Antigen, POCT Manual Read  -     POCT Strep A, Molecular  -     (Magic mouthwash) 1:1:1 diphenhydrAMINE(Benadryl) 12.5mg/5ml liq, aluminum & magnesium hydroxide-simethicone (Maalox), LIDOcaine viscous 2%; Swish and spit 10 mLs every 4 (four) hours as needed (sore throat).  Dispense: 180 mL; Refill: 0    Cough, unspecified type  -     promethazine-dextromethorphan (PROMETHAZINE-DM) 6.25-15 mg/5 mL Syrp; Take 5 mLs by mouth nightly as needed (cough).  Dispense: 120 mL; Refill: 0      Patient Instructions                                                         Pharyngitis   If your condition worsens or fails to improve we recommend that you receive another evaluation at the ER immediately or contact your PCP to discuss your concerns or return here. You must understand that you've received an urgent care treatment only and that you may be released before all your medical problems are known or treated. You the patient will arrange for followup care as instructed.   If the strept culture was done and returns negative in 3-5 days and you are still having a sore throat, you may need to get a mono spot test done or repeated.   Tylenol or ibuprofen for pain may help as long as you are not allergic to these meds or have a medical condition such as stomach ulcers, liver or kidney disease or taking blood thinners etc that would   prevent you from using these medications.   Rest and fluids will help as well.   If you were prescribed antibiotics and are female and on BCP use additional methods to prevent pregnancy while on the antibiotics and for one cycle after

## 2023-04-13 ENCOUNTER — OFFICE VISIT (OUTPATIENT)
Dept: URGENT CARE | Facility: CLINIC | Age: 28
End: 2023-04-13
Payer: COMMERCIAL

## 2023-04-13 VITALS
OXYGEN SATURATION: 96 % | TEMPERATURE: 98 F | WEIGHT: 198 LBS | HEIGHT: 60 IN | DIASTOLIC BLOOD PRESSURE: 73 MMHG | SYSTOLIC BLOOD PRESSURE: 98 MMHG | RESPIRATION RATE: 18 BRPM | HEART RATE: 86 BPM | BODY MASS INDEX: 38.87 KG/M2

## 2023-04-13 DIAGNOSIS — R59.9 SWOLLEN LYMPH NODES: ICD-10-CM

## 2023-04-13 DIAGNOSIS — J02.9 ACUTE VIRAL PHARYNGITIS: ICD-10-CM

## 2023-04-13 DIAGNOSIS — J02.9 SORE THROAT: Primary | ICD-10-CM

## 2023-04-13 LAB
CTP QC/QA: YES
HETEROPH AB SER QL: NEGATIVE
MOLECULAR STREP A: NEGATIVE
SARS-COV-2 AG RESP QL IA.RAPID: NEGATIVE

## 2023-04-13 PROCEDURE — 87081 CULTURE SCREEN ONLY: CPT

## 2023-04-13 PROCEDURE — 87651 STREP A DNA AMP PROBE: CPT | Mod: QW,S$GLB,,

## 2023-04-13 PROCEDURE — 87811 SARS-COV-2 COVID19 W/OPTIC: CPT | Mod: QW,S$GLB,,

## 2023-04-13 PROCEDURE — 86308 HETEROPHILE ANTIBODY SCREEN: CPT | Mod: QW,S$GLB,,

## 2023-04-13 PROCEDURE — 87811 SARS CORONAVIRUS 2 ANTIGEN POCT, MANUAL READ: ICD-10-PCS | Mod: QW,S$GLB,,

## 2023-04-13 PROCEDURE — 99213 PR OFFICE/OUTPT VISIT, EST, LEVL III, 20-29 MIN: ICD-10-PCS | Mod: S$GLB,,,

## 2023-04-13 PROCEDURE — 87651 POCT STREP A MOLECULAR: ICD-10-PCS | Mod: QW,S$GLB,,

## 2023-04-13 PROCEDURE — 99213 OFFICE O/P EST LOW 20 MIN: CPT | Mod: S$GLB,,,

## 2023-04-13 PROCEDURE — 86308 POCT INFECTIOUS MONONUCLEOSIS: ICD-10-PCS | Mod: QW,S$GLB,,

## 2023-04-13 RX ORDER — PENICILLIN V POTASSIUM 500 MG/1
500 TABLET, FILM COATED ORAL 2 TIMES DAILY
Qty: 20 TABLET | Refills: 0 | Status: SHIPPED | OUTPATIENT
Start: 2023-04-13 | End: 2023-04-23

## 2023-04-13 NOTE — LETTER
[address]  [place] [address]  Phone: [phone]  Fax: [phone]          Return to Work/School    Patient: Jolly Whyte  YOB: 1995   Date: 04/13/2023     To Whom It May Concern:     Jolly Whyte was in contact with/seen in my office on 04/13/2023. COVID-19 is present in our communities across the state. There is limited testing for COVID at this time, so not all patients can be tested. In this situation, your employee meets the following criteria:     Jolly Whyte has met the criteria for COVID-19 testing and has a NEGATIVE result. The employee can return to work once they are asymptomatic for 24 hours without the use of fever reducing medications (Tylenol, Motrin, etc).     If you have any questions or concerns, or if I can be of further assistance, please do not hesitate to contact me.     Sincerely,    Kandice De Leon PA-C

## 2023-04-13 NOTE — PROGRESS NOTES
Subjective:      Patient ID: Jolly Whyte is a 27 y.o. female.    Vitals:  height is 5' (1.524 m) and weight is 89.8 kg (198 lb). Her temperature is 98.3 °F (36.8 °C). Her blood pressure is 98/73 and her pulse is 86. Her respiration is 18 and oxygen saturation is 96%.     Chief Complaint: Sore Throat    28 yo female Patient presents to the clinic with a sore throat worse on the L side, fever last night of 102 which was relieved with Tylenol, x 4 days. States sore throat got worse. Was seen on the 11th for similar symptoms and given magic mouthwash (which she could not get filled for a couple of days due to pharmacy not having ingredients) and given cough syrup. States she did not take any Tylenol this morning. Denies runny nose, congestion, ear pain or drainage, chest pain, shortness of breath, drooling. NKDA.     Sore Throat   This is a new problem. The current episode started in the past 7 days. The problem has been gradually worsening. Associated symptoms include coughing, swollen glands and trouble swallowing (from discomfort). Pertinent negatives include no congestion, drooling, ear discharge, ear pain, neck pain, shortness of breath or stridor. She has tried acetaminophen for the symptoms. The treatment provided mild relief.     Constitution: Positive for fever. Negative for chills.   HENT:  Positive for sore throat and trouble swallowing (from discomfort). Negative for ear pain, ear discharge, drooling, mouth sores, congestion, sinus pain, sinus pressure and voice change.    Neck: Negative for neck pain and neck stiffness.   Cardiovascular:  Negative for chest pain.   Eyes:  Negative for eye discharge and eye redness.   Respiratory:  Positive for cough. Negative for shortness of breath, stridor and wheezing.     Objective:     Vitals:    04/13/23 1002   BP: 98/73   Pulse: 86   Resp: 18   Temp: 98.3 °F (36.8 °C)       Physical Exam   Constitutional: She is oriented to person, place, and time. She appears  well-developed. She is cooperative.  Non-toxic appearance. She does not appear ill. No distress.   HENT:   Head: Normocephalic and atraumatic.   Ears:   Right Ear: Hearing, tympanic membrane, external ear and ear canal normal.   Left Ear: Hearing, tympanic membrane, external ear and ear canal normal.   Nose: Nose normal. No mucosal edema, rhinorrhea or nasal deformity. No epistaxis. Right sinus exhibits no maxillary sinus tenderness and no frontal sinus tenderness. Left sinus exhibits no maxillary sinus tenderness and no frontal sinus tenderness.   Mouth/Throat: Uvula is midline and mucous membranes are normal. Mucous membranes are moist. No trismus in the jaw. Normal dentition. No uvula swelling. Posterior oropharyngeal erythema present. No oropharyngeal exudate or posterior oropharyngeal edema. Tonsils are 0 on the right. Tonsils are 0 on the left. No tonsillar exudate.   Eyes: Conjunctivae and lids are normal. Pupils are equal, round, and reactive to light. Right eye exhibits no discharge. Left eye exhibits no discharge. No scleral icterus.   Neck: Trachea normal and phonation normal. Neck supple. No edema present. No erythema present. No neck rigidity present.   Cardiovascular: Normal rate, regular rhythm, normal heart sounds and normal pulses.   Pulmonary/Chest: Effort normal and breath sounds normal. No respiratory distress. She has no decreased breath sounds. She has no wheezes. She has no rhonchi. She has no rales.   Abdominal: Normal appearance.   Musculoskeletal: Normal range of motion.         General: No deformity. Normal range of motion.   Lymphadenopathy:     She has cervical adenopathy.   Neurological: She is alert and oriented to person, place, and time. She exhibits normal muscle tone. Coordination normal.   Skin: Skin is warm, dry, intact, not diaphoretic and not pale.   Psychiatric: Her speech is normal and behavior is normal. Judgment and thought content normal.   Nursing note and vitals  reviewed.    Assessment:     1. Sore throat    2. Acute viral pharyngitis    3. Swollen lymph nodes        Results for orders placed or performed in visit on 04/13/23   SARS Coronavirus 2 Antigen, POCT Manual Read   Result Value Ref Range    SARS Coronavirus 2 Antigen Negative Negative     Acceptable Yes    POCT Strep A, Molecular   Result Value Ref Range    Molecular Strep A, POC Negative Negative     Acceptable Yes        Plan:       Sore throat  -     SARS Coronavirus 2 Antigen, POCT Manual Read  -     POCT Strep A, Molecular  -     CULTURE, STREP A,  THROAT  -     POCT Infectious mononucleosis antibody    Acute viral pharyngitis    Swollen lymph nodes    Other orders  -     penicillin v potassium (VEETID) 500 MG tablet; Take 1 tablet (500 mg total) by mouth 2 (two) times daily. for 10 days  Dispense: 20 tablet; Refill: 0        Patient Instructions                                                         Pharyngitis   Reviewed positive strep test with patient who verbalized understanding.  We discussed treatment with an antibiotic to cover the strep throat infection as well as magic mouthwash to cover sore throat symptoms.  We also discussed at home remedies to help with current symptoms and over-the-counter medications that can also help with diagnosis.  Patient educational handouts also included with discharge instructions for patient who verbalized understanding agrees with plan of care.  Patient denies any further questions or concerns at this time.  Patient exits exam room in no acute distress.      If your condition worsens or fails to improve we recommend that you receive another evaluation at the ER immediately or contact your PCP to discuss your concerns or return here. You must understand that you've received an urgent care treatment only and that you may be released before all your medical problems are known or treated. You the patient will arrange for followup care as  instructed.   The majority of all sore throats or tonsillitis are viral and antibiotics will not treat this.     Strep test:  - Complete the full course of antibiotics given  - Get plenty of rest and Drink plenty of cool liquids while avoid any beverage or food that can irritate your throat (acidic, spicy or salty foods).  - Use magic mouthwash as prescribed to cover sore throat symptoms or use diluted hydrogen peroxide  - Use over the counter Cepacol to soothe throat symptoms  - Tylenol or ibuprofen for pain may help as long as you are not allergic to these meds or have a medical condition such as stomach ulcers, liver or kidney disease or taking blood thinners etc that would prevent you from using these medications.   - Throw away your toothbrush now and when you complete your antibiotics.      If the strep culture was done and returns negative in 3-5 days and you are still having a sore throat, you may need to get a mono spot test done or repeated.     If you were prescribed antibiotics and are female and on BCP use additional methods to prevent pregnancy while on the antibiotics and for one cycle after.       Medical Decision Making:   Differential Diagnosis:   Viral pharyngitis  Mononucleosis  Strep pharyngitis    Clinical Tests:   Lab Tests: Ordered and Reviewed       <> Summary of Lab: COVID negative  POCT strep negative  Urgent Care Management:  Ordered mono spot test and strep culture. Prescribed antibiotics on a wait and see antibiotic plan based on results. Patient has a centor criteria of 2. Patient agreeable to plan and will continue to use prescriptions from last visit in addition to OTC medication we discussed today such as throat lozanges, humidifiers, warm tea with honey etc.

## 2023-04-13 NOTE — PATIENT INSTRUCTIONS
Pharyngitis   Reviewed positive strep test with patient who verbalized understanding.  We discussed treatment with an antibiotic to cover the strep throat infection as well as magic mouthwash to cover sore throat symptoms.  We also discussed at home remedies to help with current symptoms and over-the-counter medications that can also help with diagnosis.  Patient educational handouts also included with discharge instructions for patient who verbalized understanding agrees with plan of care.  Patient denies any further questions or concerns at this time.  Patient exits exam room in no acute distress.      If your condition worsens or fails to improve we recommend that you receive another evaluation at the ER immediately or contact your PCP to discuss your concerns or return here. You must understand that you've received an urgent care treatment only and that you may be released before all your medical problems are known or treated. You the patient will arrange for followup care as instructed.   The majority of all sore throats or tonsillitis are viral and antibiotics will not treat this.     Strep test:  - Complete the full course of antibiotics given  - Get plenty of rest and Drink plenty of cool liquids while avoid any beverage or food that can irritate your throat (acidic, spicy or salty foods).  - Use magic mouthwash as prescribed to cover sore throat symptoms or use diluted hydrogen peroxide  - Use over the counter Cepacol to soothe throat symptoms  - Tylenol or ibuprofen for pain may help as long as you are not allergic to these meds or have a medical condition such as stomach ulcers, liver or kidney disease or taking blood thinners etc that would prevent you from using these medications.   - Throw away your toothbrush now and when you complete your antibiotics.      If the strep culture was done and returns negative in 3-5 days and you are still having a  sore throat, you may need to get a mono spot test done or repeated.     If you were prescribed antibiotics and are female and on BCP use additional methods to prevent pregnancy while on the antibiotics and for one cycle after.

## 2023-04-15 ENCOUNTER — TELEPHONE (OUTPATIENT)
Dept: URGENT CARE | Facility: CLINIC | Age: 28
End: 2023-04-15
Payer: COMMERCIAL

## 2023-04-15 LAB — BACTERIA THROAT CULT: NORMAL

## 2023-04-28 ENCOUNTER — LAB VISIT (OUTPATIENT)
Dept: LAB | Facility: HOSPITAL | Age: 28
End: 2023-04-28
Attending: NURSE PRACTITIONER
Payer: COMMERCIAL

## 2023-04-28 DIAGNOSIS — E06.3 HASHIMOTO'S DISEASE: ICD-10-CM

## 2023-04-28 LAB — TSH SERPL DL<=0.005 MIU/L-ACNC: 3.49 UIU/ML (ref 0.34–5.6)

## 2023-04-28 PROCEDURE — 36415 COLL VENOUS BLD VENIPUNCTURE: CPT | Performed by: NURSE PRACTITIONER

## 2023-04-28 PROCEDURE — 84443 ASSAY THYROID STIM HORMONE: CPT | Performed by: NURSE PRACTITIONER

## 2023-05-01 ENCOUNTER — PATIENT MESSAGE (OUTPATIENT)
Dept: ENDOCRINOLOGY | Facility: CLINIC | Age: 28
End: 2023-05-01
Payer: COMMERCIAL

## 2023-05-01 DIAGNOSIS — E06.3 HASHIMOTO'S DISEASE: Primary | ICD-10-CM

## 2023-05-01 RX ORDER — LEVOTHYROXINE SODIUM 88 UG/1
88 TABLET ORAL
Qty: 30 TABLET | Refills: 11 | Status: SHIPPED | OUTPATIENT
Start: 2023-05-01 | End: 2023-06-26

## 2023-06-09 ENCOUNTER — PATIENT MESSAGE (OUTPATIENT)
Dept: RESEARCH | Facility: HOSPITAL | Age: 28
End: 2023-06-09
Payer: COMMERCIAL

## 2023-06-26 ENCOUNTER — PATIENT MESSAGE (OUTPATIENT)
Dept: ENDOCRINOLOGY | Facility: CLINIC | Age: 28
End: 2023-06-26
Payer: COMMERCIAL

## 2023-06-26 ENCOUNTER — LAB VISIT (OUTPATIENT)
Dept: LAB | Facility: HOSPITAL | Age: 28
End: 2023-06-26
Attending: NURSE PRACTITIONER
Payer: COMMERCIAL

## 2023-06-26 DIAGNOSIS — E06.3 HASHIMOTO'S DISEASE: ICD-10-CM

## 2023-06-26 DIAGNOSIS — E06.3 HASHIMOTO'S DISEASE: Primary | ICD-10-CM

## 2023-06-26 LAB — TSH SERPL DL<=0.005 MIU/L-ACNC: 3.12 UIU/ML (ref 0.34–5.6)

## 2023-06-26 PROCEDURE — 84443 ASSAY THYROID STIM HORMONE: CPT | Performed by: NURSE PRACTITIONER

## 2023-06-26 PROCEDURE — 36415 COLL VENOUS BLD VENIPUNCTURE: CPT | Performed by: NURSE PRACTITIONER

## 2023-06-26 RX ORDER — LEVOTHYROXINE SODIUM 100 UG/1
100 TABLET ORAL
Qty: 30 TABLET | Refills: 11 | Status: SHIPPED | OUTPATIENT
Start: 2023-06-26 | End: 2023-06-26

## 2023-06-26 RX ORDER — LEVOTHYROXINE SODIUM 100 UG/1
100 TABLET ORAL
Qty: 30 TABLET | Refills: 11 | Status: SHIPPED | OUTPATIENT
Start: 2023-06-26 | End: 2023-09-19 | Stop reason: SDUPTHER

## 2023-08-11 ENCOUNTER — CLINICAL SUPPORT (OUTPATIENT)
Dept: OTHER | Facility: CLINIC | Age: 28
End: 2023-08-11
Payer: COMMERCIAL

## 2023-08-11 DIAGNOSIS — Z00.8 ENCOUNTER FOR OTHER GENERAL EXAMINATION: ICD-10-CM

## 2023-08-11 PROCEDURE — 80061 PR  LIPID PANEL: ICD-10-PCS | Mod: QW,S$GLB,, | Performed by: INTERNAL MEDICINE

## 2023-08-11 PROCEDURE — 82947 PR  ASSAY QUANTITATIVE,BLOOD GLUCOSE: ICD-10-PCS | Mod: QW,S$GLB,, | Performed by: INTERNAL MEDICINE

## 2023-08-11 PROCEDURE — 82947 ASSAY GLUCOSE BLOOD QUANT: CPT | Mod: QW,S$GLB,, | Performed by: INTERNAL MEDICINE

## 2023-08-11 PROCEDURE — 80061 LIPID PANEL: CPT | Mod: QW,S$GLB,, | Performed by: INTERNAL MEDICINE

## 2023-08-11 PROCEDURE — 99401 PR PREVENT COUNSEL,INDIV,15 MIN: ICD-10-PCS | Mod: S$GLB,,, | Performed by: INTERNAL MEDICINE

## 2023-08-11 PROCEDURE — 99401 PREV MED CNSL INDIV APPRX 15: CPT | Mod: S$GLB,,, | Performed by: INTERNAL MEDICINE

## 2023-08-12 VITALS
BODY MASS INDEX: 32.95 KG/M2 | HEIGHT: 66 IN | SYSTOLIC BLOOD PRESSURE: 104 MMHG | DIASTOLIC BLOOD PRESSURE: 70 MMHG | WEIGHT: 205 LBS

## 2023-08-12 LAB
GLUCOSE SERPL-MCNC: 106 MG/DL (ref 60–140)
HDLC SERPL-MCNC: 30 MG/DL
POC CHOLESTEROL, LDL (DOCK): 146 MG/DL
POC CHOLESTEROL, TOTAL: 220 MG/DL
TRIGL SERPL-MCNC: 240 MG/DL

## 2023-09-18 ENCOUNTER — OFFICE VISIT (OUTPATIENT)
Dept: URGENT CARE | Facility: CLINIC | Age: 28
End: 2023-09-18
Payer: COMMERCIAL

## 2023-09-18 VITALS
TEMPERATURE: 98 F | RESPIRATION RATE: 20 BRPM | DIASTOLIC BLOOD PRESSURE: 76 MMHG | WEIGHT: 205 LBS | SYSTOLIC BLOOD PRESSURE: 107 MMHG | BODY MASS INDEX: 32.95 KG/M2 | HEART RATE: 86 BPM | OXYGEN SATURATION: 98 % | HEIGHT: 66 IN

## 2023-09-18 DIAGNOSIS — R05.9 COUGH, UNSPECIFIED TYPE: Primary | ICD-10-CM

## 2023-09-18 DIAGNOSIS — R11.0 NAUSEA: ICD-10-CM

## 2023-09-18 LAB
CTP QC/QA: YES
SARS-COV-2 AG RESP QL IA.RAPID: NEGATIVE

## 2023-09-18 PROCEDURE — 99203 PR OFFICE/OUTPT VISIT, NEW, LEVL III, 30-44 MIN: ICD-10-PCS | Mod: S$GLB,,,

## 2023-09-18 PROCEDURE — 87811 SARS CORONAVIRUS 2 ANTIGEN POCT, MANUAL READ: ICD-10-PCS | Mod: QW,S$GLB,,

## 2023-09-18 PROCEDURE — 87811 SARS-COV-2 COVID19 W/OPTIC: CPT | Mod: QW,S$GLB,,

## 2023-09-18 PROCEDURE — 99203 OFFICE O/P NEW LOW 30 MIN: CPT | Mod: S$GLB,,,

## 2023-09-18 RX ORDER — PROMETHAZINE HYDROCHLORIDE AND DEXTROMETHORPHAN HYDROBROMIDE 6.25; 15 MG/5ML; MG/5ML
5 SYRUP ORAL EVERY 4 HOURS PRN
Qty: 118 ML | Refills: 0 | Status: SHIPPED | OUTPATIENT
Start: 2023-09-18 | End: 2023-09-28

## 2023-09-18 RX ORDER — ONDANSETRON 8 MG/1
8 TABLET, ORALLY DISINTEGRATING ORAL 2 TIMES DAILY
Qty: 28 TABLET | Refills: 0 | Status: SHIPPED | OUTPATIENT
Start: 2023-09-18 | End: 2023-10-02

## 2023-09-18 NOTE — PROGRESS NOTES
"Subjective:      Patient ID: Jolly Whyte is a 28 y.o. female.    Vitals:  height is 5' 6" (1.676 m) and weight is 93 kg (205 lb 0.4 oz). Her oral temperature is 98.4 °F (36.9 °C). Her blood pressure is 107/76 and her pulse is 86. Her respiration is 20 and oxygen saturation is 98%.     Chief Complaint: Cough    This is a 28 y.o. female who presents today with a chief complaint of fever of 101 , headache, coughing and diarrhea x 2 days.      Provider note:    29 yo F c/o sore throat, cough. Nausea, diarrhea, bodyaches, fever, x 2 days. [place] has covid. Denies sob, wheezing. Taking advil, sinus and cold medicine otc without relief. She had covid about 1 year ago.     Headache   This is a new problem. The current episode started in the past 7 days. The problem occurs constantly. The problem has been unchanged. Associated symptoms include coughing, a fever, nausea, sinus pressure and a sore throat. Pertinent negatives include no ear pain or vomiting.       Constitution: Positive for fever. Negative for chills, sweating and fatigue.   HENT:  Positive for congestion, sinus pressure and sore throat. Negative for ear pain, sinus pain and trouble swallowing.    Respiratory:  Positive for cough. Negative for sputum production, shortness of breath and wheezing.    Gastrointestinal:  Positive for nausea and diarrhea. Negative for vomiting and constipation.   Musculoskeletal:  Positive for muscle ache.   Neurological:  Positive for headaches.      Objective:     Physical Exam   Constitutional: She is oriented to person, place, and time. normal  HENT:   Ears:   Right Ear: Tympanic membrane, external ear and ear canal normal.   Left Ear: Tympanic membrane, external ear and ear canal normal.   Nose: Congestion present.   Mouth/Throat: No oropharyngeal exudate or posterior oropharyngeal erythema.   Eyes: Conjunctivae are normal. Pupils are equal, round, and reactive to light. Extraocular movement intact   Cardiovascular: Normal " rate, regular rhythm and normal heart sounds.   Pulmonary/Chest: Effort normal and breath sounds normal.   Abdominal: Normal appearance.   Musculoskeletal: Normal range of motion.         General: Normal range of motion.   Neurological: She is alert and oriented to person, place, and time.   Skin: Skin is warm and dry.     Assessment:     1. Cough, unspecified type        Plan:     Results for orders placed or performed in visit on 09/18/23   SARS Coronavirus 2 Antigen, POCT Manual Read   Result Value Ref Range    SARS Coronavirus 2 Antigen Negative Negative     Acceptable Yes        Cough, unspecified type  -     SARS Coronavirus 2 Antigen, POCT Manual Read  -     promethazine-dextromethorphan (PROMETHAZINE-DM) 6.25-15 mg/5 mL Syrp; Take 5 mLs by mouth every 4 (four) hours as needed.  Dispense: 118 mL; Refill: 0

## 2023-09-18 NOTE — LETTER
September 18, 2023      [facility]  [address] [place] [address]  Phone: [phone]  Fax: [phone]       Patient: Jolly Whyte   YOB: 1995  Date of Visit: 09/18/2023    To Whom It May Concern:    Alia Whyte  was at [facility] on 09/18/2023. The patient may return to work/school on 9/19/23 with no restrictions. If you have any questions or concerns, or if I can be of further assistance, please do not hesitate to contact me.    Sincerely,    Gracie Morejon NP

## 2023-12-26 DIAGNOSIS — E06.3 HASHIMOTO'S DISEASE: ICD-10-CM

## 2023-12-26 RX ORDER — LEVOTHYROXINE SODIUM 100 UG/1
100 TABLET ORAL
Qty: 30 TABLET | Refills: 2 | Status: SHIPPED | OUTPATIENT
Start: 2023-12-26 | End: 2023-12-27 | Stop reason: SDUPTHER

## 2023-12-27 DIAGNOSIS — E06.3 HASHIMOTO'S DISEASE: ICD-10-CM

## 2023-12-27 RX ORDER — LEVOTHYROXINE SODIUM 100 UG/1
100 TABLET ORAL
Qty: 30 TABLET | Refills: 2 | Status: SHIPPED | OUTPATIENT
Start: 2023-12-27 | End: 2023-12-29 | Stop reason: SDUPTHER

## 2023-12-29 ENCOUNTER — PATIENT MESSAGE (OUTPATIENT)
Dept: ENDOCRINOLOGY | Facility: CLINIC | Age: 28
End: 2023-12-29
Payer: COMMERCIAL

## 2023-12-29 DIAGNOSIS — E06.3 HASHIMOTO'S DISEASE: ICD-10-CM

## 2024-01-02 RX ORDER — LEVOTHYROXINE SODIUM 100 UG/1
100 TABLET ORAL
Qty: 30 TABLET | Refills: 2 | Status: SHIPPED | OUTPATIENT
Start: 2024-01-02

## 2024-01-31 ENCOUNTER — PATIENT MESSAGE (OUTPATIENT)
Dept: ENDOCRINOLOGY | Facility: CLINIC | Age: 29
End: 2024-01-31
Payer: COMMERCIAL

## 2024-01-31 DIAGNOSIS — E06.3 HASHIMOTO'S DISEASE: Primary | ICD-10-CM

## 2024-01-31 DIAGNOSIS — E55.9 VITAMIN D DEFICIENCY: ICD-10-CM

## 2024-02-01 ENCOUNTER — HOSPITAL ENCOUNTER (OUTPATIENT)
Dept: RADIOLOGY | Facility: HOSPITAL | Age: 29
Discharge: HOME | End: 2024-02-01
Attending: NURSE PRACTITIONER
Payer: COMMERCIAL

## 2024-02-01 DIAGNOSIS — E06.3 HASHIMOTO'S DISEASE: ICD-10-CM

## 2024-02-01 PROCEDURE — 76536 US EXAM OF HEAD AND NECK: CPT | Mod: TC

## 2024-02-02 ENCOUNTER — PATIENT MESSAGE (OUTPATIENT)
Dept: ENDOCRINOLOGY | Facility: CLINIC | Age: 29
End: 2024-02-02
Payer: COMMERCIAL

## 2024-02-02 DIAGNOSIS — E06.3 HASHIMOTO'S DISEASE: Primary | ICD-10-CM

## 2024-03-01 ENCOUNTER — LAB VISIT (OUTPATIENT)
Dept: LAB | Facility: HOSPITAL | Age: 29
End: 2024-03-01
Attending: NURSE PRACTITIONER
Payer: COMMERCIAL

## 2024-03-01 DIAGNOSIS — E55.9 VITAMIN D DEFICIENCY: ICD-10-CM

## 2024-03-01 DIAGNOSIS — E06.3 HASHIMOTO'S DISEASE: ICD-10-CM

## 2024-03-01 LAB
25(OH)D3+25(OH)D2 SERPL-MCNC: 11 NG/ML (ref 30–96)
ALBUMIN SERPL BCP-MCNC: 4.4 G/DL (ref 3.5–5.2)
ANION GAP SERPL CALC-SCNC: 6 MMOL/L (ref 8–16)
BUN SERPL-MCNC: 10 MG/DL (ref 6–20)
CALCIUM SERPL-MCNC: 9.5 MG/DL (ref 8.7–10.5)
CHLORIDE SERPL-SCNC: 106 MMOL/L (ref 95–110)
CO2 SERPL-SCNC: 27 MMOL/L (ref 23–29)
CREAT SERPL-MCNC: 0.8 MG/DL (ref 0.5–1.4)
EST. GFR  (NO RACE VARIABLE): >60 ML/MIN/1.73 M^2
GLUCOSE SERPL-MCNC: 85 MG/DL (ref 70–110)
PHOSPHATE SERPL-MCNC: 2.2 MG/DL (ref 2.7–4.5)
POTASSIUM SERPL-SCNC: 3.7 MMOL/L (ref 3.5–5.1)
PTH-INTACT SERPL-MCNC: 65.5 PG/ML (ref 9–77)
SODIUM SERPL-SCNC: 139 MMOL/L (ref 136–145)
TSH SERPL DL<=0.005 MIU/L-ACNC: 2.38 UIU/ML (ref 0.34–5.6)

## 2024-03-01 PROCEDURE — 36415 COLL VENOUS BLD VENIPUNCTURE: CPT | Performed by: NURSE PRACTITIONER

## 2024-03-01 PROCEDURE — 83970 ASSAY OF PARATHORMONE: CPT | Performed by: NURSE PRACTITIONER

## 2024-03-01 PROCEDURE — 84443 ASSAY THYROID STIM HORMONE: CPT | Performed by: NURSE PRACTITIONER

## 2024-03-01 PROCEDURE — 82306 VITAMIN D 25 HYDROXY: CPT | Performed by: NURSE PRACTITIONER

## 2024-03-01 PROCEDURE — 80069 RENAL FUNCTION PANEL: CPT | Performed by: NURSE PRACTITIONER

## 2024-03-04 NOTE — PROGRESS NOTES
Subjective:      Patient ID: Jolly Whyte is a 28 y.o. female.    Chief Complaint:  No chief complaint on file.    History of Present Illness  Jolly Whyte is here for follow up of Hypothyroidism.  Previously seen by me 2/2023.  This is a MyChart video visit.    The patient location is: [place]  The chief complaint leading to consultation is: Hypothyroid  Visit type: Virtual visit with synchronous audio and video  Total time spent with patient: see below   Each patient to whom he or she provides medical services by telemedicine is:  (1) informed of the relationship between the physician and patient and the respective role of any other health care provider with respect to management of the patient; and (2) notified that he or she may decline to receive medical services by telemedicine and may withdraw from such care at any time.      With regards to Hypothyroidism:    Diagnosed 10/2019.     Ref. Range 3/16/2020 10:26   Thyroid Peroxidase Ab Latest Ref Range: <9 IU/mL 814 (H)     Thyroid US  2/2024  Right thyroid gland measures 4.9 x 1.4 x 1.5 cm. Left thyroid gland measures 4.9 x 1.2 x 1.4 cm. The isthmus measures 0.3 cm thick. Mild heterogeneous echotexture of the thyroid gland. Normal vascularity of the thyroid gland demonstrated.  Oval-shaped hypoechoic nodule of the inferior right thyroid gland measures 5 x 3 x 4 mm.  Prominent left parathyroid gland observed measuring approximately 7 x 3 x 3 mm.  IMPRESSION:  1.  Hypoechoic nodule of the inferior right thyroid gland measures 5 x 3 x 4 mm. Continued observation recommended.  2.  Prominent left parathyroid gland observed, could potentially reflect a parathyroid adenoma. May consider further evaluation with nuclear medicine parathyroid sestamibi scan if clinically necessary.    Lab Results   Component Value Date    TSH 2.381 03/01/2024    THYROIDAB 814 (H) 03/16/2020    FREET4 1.04 02/24/2023     Current Medication:  Levothyroxine 100mcg 1 tablet daily     Calcium/  Iron: Denies     Biotin Use: Denies     Takes thyroid medication properly without food first thing in the morning.    Reports fatigue, hair loss, brittle nails, heat intolerance.    Plans for fertility: Yes.    With regards to Vitamin D Deficiency:    Vit D, 25-Hydroxy   Date Value Ref Range Status   03/01/2024 11 (L) 30 - 96 ng/mL Final     Comment:     Vitamin D deficiency.........<10 ng/mL                              Vitamin D insufficiency......10-29 ng/mL       Vitamin D sufficiency........> or equal to 30 ng/mL  Vitamin D toxicity............>100 ng/mL         Current Meds: None.    She tried OTC Vit D3 2000iu daily - diarrhea.      With regards to Hyperparathyroidism:    Thyroid US  2/2024  Right thyroid gland measures 4.9 x 1.4 x 1.5 cm. Left thyroid gland measures 4.9 x 1.2 x 1.4 cm. The isthmus measures 0.3 cm thick. Mild heterogeneous echotexture of the thyroid gland. Normal vascularity of the thyroid gland demonstrated.  Oval-shaped hypoechoic nodule of the inferior right thyroid gland measures 5 x 3 x 4 mm.  Prominent left parathyroid gland observed measuring approximately 7 x 3 x 3 mm.  IMPRESSION:  1.  Hypoechoic nodule of the inferior right thyroid gland measures 5 x 3 x 4 mm. Continued observation recommended.  2.  Prominent left parathyroid gland observed, could potentially reflect a parathyroid adenoma. May consider further evaluation with nuclear medicine parathyroid sestamibi scan if clinically necessary.    Lab Results   Component Value Date    PTH 65.5 03/01/2024    CALCIUM 9.5 03/01/2024    PHOS 2.2 (L) 03/01/2024     Lab Results   Component Value Date    ALBUMIN 4.4 03/01/2024       Daily intake of calcium is: None  Daily intake of vitamin D:  None    BMD:  None    Denies constipation, depression, polyuria, muscle aches or pains.  Denies height loss or kidney stones.  Reports prior fractures as a kid - arm and leg.   Denies history of renal disease.  Denies family history of  "hyperparathyroidism or calcium problems.  Denies HCTZ., lithium, or chlorthiadone use.      Pituitary Adenoma HPI    5/2015  MRI report 4 x 2 mm pituitary microadenoma     3/2016  MRI 4x2mm stable pituitary microadenoma    2/2017  MRI impression- stable appearance    2019  Functional Workup- prolactin, IGF1, TFTs unremarkable    2021  DMST unremarkable    2023  Prolactin normal     Reports regular menstrual cycles every 28 days that lasts for 5-7 days.    Not on OCPs.   Reports plans for fertility.     Reports fatigue, hair loss, brittle nails, heat intolerance.    Denies constipation, CP, SOB, diarrhea, unexplained weight changes, palpitations.    Denies history of HTN or diabetes.    Denies anorexia/nausea/vomiting, orthostatic symptoms.    Denies facial edema/erythema, hirsutism/acne/voice deepening, truncal obesity, kidney stones/hematuria.    Reports easy bruising, red/purple striae on inner arms and abdomen.     Denies galactorrhea, polyuria/polydipsia, unusual headaches, peripheral vision loss.     Review of Systems  As above        There were no vitals taken for this visit.      There is no height or weight on file to calculate BMI.    Lab Review:   No results found for: "HGBA1C"    Lab Results   Component Value Date    CHOL 184 08/06/2018    HDL 51 08/06/2018    LDLCALC 117 (H) 08/06/2018    TRIG 70 08/06/2018    CHOLHDL 3.6 08/06/2018     Lab Results   Component Value Date     03/01/2024    K 3.7 03/01/2024     03/01/2024    CO2 27 03/01/2024    GLU 85 03/01/2024    BUN 10 03/01/2024    CREATININE 0.8 03/01/2024    CALCIUM 9.5 03/01/2024    PROT 6.9 02/24/2023    ALBUMIN 4.4 03/01/2024    BILITOT 0.5 02/24/2023    ALKPHOS 72 02/24/2023    AST 35 02/24/2023    ALT 65 (H) 02/24/2023    ANIONGAP 6 (L) 03/01/2024    ESTGFRAFRICA >60.0 09/23/2021    EGFRNONAA >60.0 09/23/2021    TSH 2.381 03/01/2024     Vit D, 25-Hydroxy   Date Value Ref Range Status   03/01/2024 11 (L) 30 - 96 ng/mL Final     " Comment:     Vitamin D deficiency.........<10 ng/mL                              Vitamin D insufficiency......10-29 ng/mL       Vitamin D sufficiency........> or equal to 30 ng/mL  Vitamin D toxicity............>100 ng/mL       Assessment and Plan     1. Hashimoto's disease  TSH      2. Parathyroid adenoma  PTH, Intact    Renal Function Panel    Vitamin D      3. Vitamin D deficiency        4. Pituitary microadenoma            Hashimoto's disease  -- Labs in 4 months.  -- Calcium and iron need to be  from thyroid hormone replacement by 4 or > hours.  -- Please note: if you are taking biotin please hold it for 5 days prior to labs as it can interfere with the thyroid testing.  -- Medication Changes:   Levothyroxine 100mcg 1 tablet daily   -- Clinically and biochemically euthyroid.  -- Goal is a TSH <2.5.  -- Avoid exogenous hyperthyroidism as this can accelerate bone loss and increase risk of CV complications.  -- Advised to take LT4 on an empty stomach with water and to wait 30-45 minutes before eating or taking other medications   -- Reviewed usual times of thyroid hormone changes  -- Reviewed that symptoms of hypothyroidism may not correlate with tsh, and a normal TSH is the goal of therapy. Symptoms are not a justification for over treatment.  -- Reviewed that the patient must call if she starts/stops OCPs or becomes pregnant because of the probable required change in LT4 dosage that will occur.     Vitamin D deficiency  -- START OTC Vit D3 2000iu daily.    Parathyroid adenoma  -- Thyroid US 2/2024 noted possible parathyroid adenoma.  -- PTH, Calcium normal. Phos and vitamin D low.  -- Replete vitamin D then check 24 hour urine ca/cr.  -- Labs in 4 months.    Pituitary microadenoma  -- Pituitary microadenoma stable on imaging - 2015, 2016, 2017.  -- Functional Workup 2019 -  prolactin, IGF1, TFTs, DMST unremarkable.  -- Will repeat for clinical change.         Follow up in about 6 months (around  9/11/2024).      I spent 30 minutes face-to-face with the patient, over half of the visit was spent on counseling and/or coordinating the care of the patient.    Counseling includes:  Diagnostic results, impressions, recommendations   Prognosis   Risk and benefits of management/treatment options   Instructions for management treatment and or follow-up   Importance of compliance with management   Risk factor reduction   Patient education

## 2024-03-11 ENCOUNTER — OFFICE VISIT (OUTPATIENT)
Dept: ENDOCRINOLOGY | Facility: CLINIC | Age: 29
End: 2024-03-11
Payer: COMMERCIAL

## 2024-03-11 DIAGNOSIS — D35.2 PITUITARY MICROADENOMA: ICD-10-CM

## 2024-03-11 DIAGNOSIS — E55.9 VITAMIN D DEFICIENCY: ICD-10-CM

## 2024-03-11 DIAGNOSIS — E06.3 HASHIMOTO'S DISEASE: Primary | ICD-10-CM

## 2024-03-11 DIAGNOSIS — D35.1 PARATHYROID ADENOMA: ICD-10-CM

## 2024-03-11 PROCEDURE — 1160F RVW MEDS BY RX/DR IN RCRD: CPT | Mod: CPTII,95,, | Performed by: NURSE PRACTITIONER

## 2024-03-11 PROCEDURE — 99214 OFFICE O/P EST MOD 30 MIN: CPT | Mod: 95,,, | Performed by: NURSE PRACTITIONER

## 2024-03-11 PROCEDURE — 1159F MED LIST DOCD IN RCRD: CPT | Mod: CPTII,95,, | Performed by: NURSE PRACTITIONER

## 2024-03-11 NOTE — Clinical Note
Labs in 4 months VV In 6 months Orders Placed This Encounter     PTH, Intact     Renal Function Panel     Vitamin D     TSH

## 2024-03-11 NOTE — ASSESSMENT & PLAN NOTE
-- Pituitary microadenoma stable on imaging - 2015, 2016, 2017.  -- Functional Workup 2019 -  prolactin, IGF1, TFTs, DMST unremarkable.  -- Will repeat for clinical change.

## 2024-03-11 NOTE — ASSESSMENT & PLAN NOTE
-- Labs in 4 months.  -- Calcium and iron need to be  from thyroid hormone replacement by 4 or > hours.  -- Please note: if you are taking biotin please hold it for 5 days prior to labs as it can interfere with the thyroid testing.  -- Medication Changes:   Levothyroxine 100mcg 1 tablet daily   -- Clinically and biochemically euthyroid.  -- Goal is a TSH <2.5.  -- Avoid exogenous hyperthyroidism as this can accelerate bone loss and increase risk of CV complications.  -- Advised to take LT4 on an empty stomach with water and to wait 30-45 minutes before eating or taking other medications   -- Reviewed usual times of thyroid hormone changes  -- Reviewed that symptoms of hypothyroidism may not correlate with tsh, and a normal TSH is the goal of therapy. Symptoms are not a justification for over treatment.  -- Reviewed that the patient must call if she starts/stops OCPs or becomes pregnant because of the probable required change in LT4 dosage that will occur.

## 2024-03-11 NOTE — ASSESSMENT & PLAN NOTE
-- Thyroid US 2/2024 noted possible parathyroid adenoma.  -- PTH, Calcium normal. Phos and vitamin D low.  -- Replete vitamin D then check 24 hour urine ca/cr.  -- Labs in 4 months.

## 2024-03-28 DIAGNOSIS — E06.3 HASHIMOTO'S DISEASE: ICD-10-CM

## 2024-03-28 RX ORDER — LEVOTHYROXINE SODIUM 100 UG/1
100 TABLET ORAL
Qty: 30 TABLET | Refills: 2 | Status: SHIPPED | OUTPATIENT
Start: 2024-03-28

## 2024-04-15 ENCOUNTER — PATIENT MESSAGE (OUTPATIENT)
Dept: ENDOCRINOLOGY | Facility: CLINIC | Age: 29
End: 2024-04-15
Payer: COMMERCIAL

## 2024-04-15 ENCOUNTER — LAB VISIT (OUTPATIENT)
Dept: LAB | Facility: HOSPITAL | Age: 29
End: 2024-04-15
Attending: NURSE PRACTITIONER
Payer: COMMERCIAL

## 2024-04-15 DIAGNOSIS — E06.3 HASHIMOTO'S DISEASE: ICD-10-CM

## 2024-04-15 DIAGNOSIS — D35.1 PARATHYROID ADENOMA: ICD-10-CM

## 2024-04-15 DIAGNOSIS — D35.1 PARATHYROID ADENOMA: Primary | ICD-10-CM

## 2024-04-15 LAB
25(OH)D3+25(OH)D2 SERPL-MCNC: 23 NG/ML (ref 30–96)
ALBUMIN SERPL BCP-MCNC: 4.4 G/DL (ref 3.5–5.2)
ANION GAP SERPL CALC-SCNC: 6 MMOL/L (ref 8–16)
BUN SERPL-MCNC: 19 MG/DL (ref 6–20)
CALCIUM SERPL-MCNC: 9.7 MG/DL (ref 8.7–10.5)
CHLORIDE SERPL-SCNC: 104 MMOL/L (ref 95–110)
CO2 SERPL-SCNC: 27 MMOL/L (ref 23–29)
CREAT SERPL-MCNC: 0.8 MG/DL (ref 0.5–1.4)
EST. GFR  (NO RACE VARIABLE): >60 ML/MIN/1.73 M^2
GLUCOSE SERPL-MCNC: 77 MG/DL (ref 70–110)
PHOSPHATE SERPL-MCNC: 3.2 MG/DL (ref 2.7–4.5)
POTASSIUM SERPL-SCNC: 3.8 MMOL/L (ref 3.5–5.1)
PTH-INTACT SERPL-MCNC: 46.7 PG/ML (ref 9–77)
SODIUM SERPL-SCNC: 137 MMOL/L (ref 136–145)
TSH SERPL DL<=0.005 MIU/L-ACNC: 0.96 UIU/ML (ref 0.34–5.6)

## 2024-04-15 PROCEDURE — 36415 COLL VENOUS BLD VENIPUNCTURE: CPT | Performed by: NURSE PRACTITIONER

## 2024-04-15 PROCEDURE — 82306 VITAMIN D 25 HYDROXY: CPT | Performed by: NURSE PRACTITIONER

## 2024-04-15 PROCEDURE — 83970 ASSAY OF PARATHORMONE: CPT | Performed by: NURSE PRACTITIONER

## 2024-04-15 PROCEDURE — 80069 RENAL FUNCTION PANEL: CPT | Performed by: NURSE PRACTITIONER

## 2024-04-15 PROCEDURE — 84443 ASSAY THYROID STIM HORMONE: CPT | Performed by: NURSE PRACTITIONER

## 2024-06-26 DIAGNOSIS — E06.3 HASHIMOTO'S DISEASE: ICD-10-CM

## 2024-06-27 RX ORDER — LEVOTHYROXINE SODIUM 100 UG/1
100 TABLET ORAL
Qty: 30 TABLET | Refills: 2 | Status: SHIPPED | OUTPATIENT
Start: 2024-06-27

## 2024-10-04 DIAGNOSIS — E06.3 HASHIMOTO'S DISEASE: ICD-10-CM

## 2024-10-04 RX ORDER — LEVOTHYROXINE SODIUM 100 UG/1
100 TABLET ORAL
Qty: 30 TABLET | Refills: 2 | Status: SHIPPED | OUTPATIENT
Start: 2024-10-04

## 2024-10-30 ENCOUNTER — TELEPHONE (OUTPATIENT)
Dept: URGENT CARE | Facility: CLINIC | Age: 29
End: 2024-10-30
Payer: COMMERCIAL

## 2024-12-28 DIAGNOSIS — E06.3 HASHIMOTO'S DISEASE: ICD-10-CM

## 2024-12-30 DIAGNOSIS — E06.3 HASHIMOTO'S DISEASE: ICD-10-CM

## 2024-12-30 RX ORDER — LEVOTHYROXINE SODIUM 100 UG/1
100 TABLET ORAL
Qty: 30 TABLET | Refills: 2 | Status: SHIPPED | OUTPATIENT
Start: 2024-12-30

## 2024-12-30 RX ORDER — LEVOTHYROXINE SODIUM 100 UG/1
100 TABLET ORAL
Qty: 90 TABLET | OUTPATIENT
Start: 2024-12-30

## 2025-01-10 ENCOUNTER — OFFICE VISIT (OUTPATIENT)
Dept: OBSTETRICS AND GYNECOLOGY | Facility: CLINIC | Age: 30
End: 2025-01-10
Payer: COMMERCIAL

## 2025-01-10 VITALS
SYSTOLIC BLOOD PRESSURE: 97 MMHG | WEIGHT: 147.94 LBS | HEART RATE: 90 BPM | DIASTOLIC BLOOD PRESSURE: 63 MMHG | BODY MASS INDEX: 23.88 KG/M2

## 2025-01-10 DIAGNOSIS — Z01.419 WELL WOMAN EXAM WITH ROUTINE GYNECOLOGICAL EXAM: Primary | ICD-10-CM

## 2025-01-10 PROCEDURE — 99999 PR PBB SHADOW E&M-EST. PATIENT-LVL III: CPT | Mod: PBBFAC,,, | Performed by: OBSTETRICS & GYNECOLOGY

## 2025-01-10 NOTE — PROGRESS NOTES
History & Physical  Gynecology      SUBJECTIVE:     Chief Complaint: Well Woman       History of Present Illness:  Annual Exam-Premenopausal  Patient presents for annual exam. The patient has no complaints today.  Menstrual cycles are monthly, regular.  The patient is sexually active. GYN screening history: last pap: approximate date 2021, NILM/hpv. The patient participates in regular exercise: yes.  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       Family History   Problem Relation Name Age of Onset    Chiari malformation Mother      Myasthenia gravis Mother      Migraines Maternal Aunt      Breast cancer Neg Hx      Colon cancer Neg Hx      Ovarian cancer Neg Hx       Social History     Tobacco Use    Smoking status: Every Day     Types: Vaping with nicotine    Smokeless tobacco: Current   Substance Use Topics    Alcohol use: No    Drug use: No       Current Outpatient Medications   Medication Sig    levothyroxine (SYNTHROID) 100 MCG tablet Take 1 tablet (100 mcg total) by mouth before breakfast.    valACYclovir (VALTREX) 1000 MG tablet Take 1 tablet (1,000 mg total) by mouth 3 (three) times daily. for 7 days     No current facility-administered medications for this visit.         Review of Systems:  Review of Systems   Constitutional:  Negative for appetite change, fever and unexpected weight change.   Respiratory:  Negative for shortness of breath.    Cardiovascular:  Negative for chest pain.   Gastrointestinal:  Negative for nausea and vomiting.   Genitourinary:   Negative for menorrhagia, menstrual problem, pelvic pain, vaginal bleeding, vaginal discharge and vaginal pain.   Integumentary:  Negative for breast mass.   Breast: Negative for lump and mass       OBJECTIVE:     Physical Exam:  Physical Exam  Vitals and nursing note reviewed.   Constitutional:       Appearance: She is well-developed.   Neck:      Thyroid: No thyromegaly.      Trachea: No tracheal deviation.   Cardiovascular:      Rate and Rhythm: Normal rate and regular rhythm.      Heart sounds: Normal heart sounds.   Pulmonary:      Effort: Pulmonary effort is normal.      Breath sounds: Normal breath sounds.   Chest:   Breasts:     Breasts are symmetrical.      Right: No inverted nipple, mass, nipple discharge, skin change or tenderness.      Left: No inverted nipple, mass, nipple discharge, skin change or tenderness.   Abdominal:      Palpations: Abdomen is soft.   Genitourinary:     General: Normal vulva.      Labia:         Right: No rash, tenderness, lesion or injury.         Left: No rash, tenderness, lesion or injury.       Urethra: No prolapse, urethral pain, urethral swelling or urethral lesion.      Vagina: Normal. No signs of injury and foreign body. No vaginal discharge, erythema, tenderness or bleeding.      Cervix: No cervical motion tenderness, discharge or friability.      Uterus: Not deviated, not enlarged, not fixed and not tender.       Adnexa:         Right: No mass, tenderness or fullness.          Left: No mass, tenderness or fullness.        Rectum: No anal fissure or external hemorrhoid.      Comments: Urethral meatus: normal size, anterior vaginal wall with no prolapse, no lesions  Bladder: no fullness, masses or tenderness  Musculoskeletal:      Cervical back: Normal range of motion and neck supple.   Neurological:      Mental Status: She is alert and oriented to person, place, and time.   Psychiatric:         Behavior: Behavior normal.         Thought Content: Thought content normal.          Judgment: Judgment normal.         Chaperoned by: Christian    ASSESSMENT:       ICD-10-CM ICD-9-CM    1. Well woman exam with routine gynecological exam  Z01.419 V72.31 Liquid-Based Pap Smear, Screening               Plan:      Jolly was seen today for well woman.    Diagnoses and all orders for this visit:    Well woman exam with routine gynecological exam  -     Liquid-Based Pap Smear, Screening          No orders of the defined types were placed in this encounter.      Well Woman:  - Pap smear done today  - Birth control:  declines  - GC/CT:n/a  - Mammogram: due age 42  - Smoking cessation: n/a  - Labs: none required   - Vaccines: s/p hpv  - Exercise counseling      Follow up in  one year for annual or prn.    Zoraida Wilson

## 2025-04-15 ENCOUNTER — PATIENT MESSAGE (OUTPATIENT)
Dept: ENDOCRINOLOGY | Facility: CLINIC | Age: 30
End: 2025-04-15
Payer: COMMERCIAL

## 2025-04-15 DIAGNOSIS — D35.1 PARATHYROID ADENOMA: Primary | ICD-10-CM

## 2025-04-15 DIAGNOSIS — E06.3 HASHIMOTO'S DISEASE: ICD-10-CM

## 2025-04-16 RX ORDER — LEVOTHYROXINE SODIUM 100 UG/1
100 TABLET ORAL
Qty: 30 TABLET | Refills: 0 | Status: SHIPPED | OUTPATIENT
Start: 2025-04-16

## 2025-05-01 ENCOUNTER — DOCUMENTATION ONLY (OUTPATIENT)
Dept: ENDOCRINOLOGY | Facility: CLINIC | Age: 30
End: 2025-05-01
Payer: COMMERCIAL

## 2025-05-01 ENCOUNTER — LAB VISIT (OUTPATIENT)
Dept: LAB | Facility: HOSPITAL | Age: 30
End: 2025-05-01
Attending: NURSE PRACTITIONER
Payer: COMMERCIAL

## 2025-05-01 DIAGNOSIS — D35.1 PARATHYROID ADENOMA: ICD-10-CM

## 2025-05-01 DIAGNOSIS — E06.3 HASHIMOTO'S DISEASE: ICD-10-CM

## 2025-05-01 LAB
25(OH)D3+25(OH)D2 SERPL-MCNC: 35 NG/ML (ref 30–96)
ALBUMIN SERPL-MCNC: 4.4 G/DL (ref 3.5–5.2)
ANION GAP (SMH): 6 MMOL/L (ref 8–16)
BUN SERPL-MCNC: 19 MG/DL (ref 6–20)
CALCIUM SERPL-MCNC: 9.4 MG/DL (ref 8.7–10.5)
CHLORIDE SERPL-SCNC: 105 MMOL/L (ref 95–110)
CO2 SERPL-SCNC: 29 MMOL/L (ref 23–29)
CREAT SERPL-MCNC: 0.7 MG/DL (ref 0.5–1.4)
GFR SERPLBLD CREATININE-BSD FMLA CKD-EPI: >60 ML/MIN/1.73/M2
GLUCOSE SERPL-MCNC: 59 MG/DL (ref 70–110)
PHOSPHATE SERPL-MCNC: 3.6 MG/DL (ref 2.7–4.5)
POTASSIUM SERPL-SCNC: 3.9 MMOL/L (ref 3.5–5.1)
PTH-INTACT SERPL-MCNC: 66.6 PG/ML (ref 9–77)
SODIUM SERPL-SCNC: 140 MMOL/L (ref 136–145)
TSH SERPL-ACNC: 1.86 UIU/ML (ref 0.34–5.6)

## 2025-05-01 PROCEDURE — 36415 COLL VENOUS BLD VENIPUNCTURE: CPT

## 2025-05-01 PROCEDURE — 82040 ASSAY OF SERUM ALBUMIN: CPT

## 2025-05-01 PROCEDURE — 82306 VITAMIN D 25 HYDROXY: CPT

## 2025-05-01 PROCEDURE — 84443 ASSAY THYROID STIM HORMONE: CPT

## 2025-05-01 PROCEDURE — 83970 ASSAY OF PARATHORMONE: CPT

## 2025-05-05 ENCOUNTER — RESULTS FOLLOW-UP (OUTPATIENT)
Dept: ENDOCRINOLOGY | Facility: CLINIC | Age: 30
End: 2025-05-05

## 2025-05-06 NOTE — PROGRESS NOTES
Subjective:      Patient ID: Jolly Whyte is a 29 y.o. female.    Chief Complaint:  Hypothyroidism    History of Present Illness  Jolly Whyte is here for follow up of Hypothyroidism.  Previously seen by me 3/2024.  This is a MyChart video visit.    The patient location is: [place]  The chief complaint leading to consultation is: Hypothyroid  Visit type: Virtual visit with synchronous audio and video  Total time spent with patient: see below   Each patient to whom he or she provides medical services by telemedicine is:  (1) informed of the relationship between the physician and patient and the respective role of any other health care provider with respect to management of the patient; and (2) notified that he or she may decline to receive medical services by telemedicine and may withdraw from such care at any time.    With regards to Hypothyroidism:    Diagnosed with Hashimoto's 10/2019.      Thyroid US  2/2024  Right thyroid gland measures 4.9 x 1.4 x 1.5 cm. Left thyroid gland measures 4.9 x 1.2 x 1.4 cm. The isthmus measures 0.3 cm thick. Mild heterogeneous echotexture of the thyroid gland. Normal vascularity of the thyroid gland demonstrated.  Oval-shaped hypoechoic nodule of the inferior right thyroid gland measures 5 x 3 x 4 mm.  Prominent left parathyroid gland observed measuring approximately 7 x 3 x 3 mm.  IMPRESSION:  1.  Hypoechoic nodule of the inferior right thyroid gland measures 5 x 3 x 4 mm. Continued observation recommended.  2.  Prominent left parathyroid gland observed, could potentially reflect a parathyroid adenoma. May consider further evaluation with nuclear medicine parathyroid sestamibi scan if clinically necessary.    Lab Results   Component Value Date    TSH 1.856 05/01/2025    THYROIDAB 814 (H) 03/16/2020    FREET4 1.04 02/24/2023     Current Medication:  Levothyroxine 100mcg 1 tablet daily     Calcium/ Iron: prenatal at night      Biotin Use: Denies     Takes thyroid medication properly  without food first thing in the morning.    Reports hair loss, brittle nails, heat intolerance.    Reports regular menstrual cycles every 28-32 days.     Plans for fertility: Yes.      With regards to Hyperparathyroidism:    Thyroid US  2/2024  Right thyroid gland measures 4.9 x 1.4 x 1.5 cm. Left thyroid gland measures 4.9 x 1.2 x 1.4 cm. The isthmus measures 0.3 cm thick. Mild heterogeneous echotexture of the thyroid gland. Normal vascularity of the thyroid gland demonstrated.  Oval-shaped hypoechoic nodule of the inferior right thyroid gland measures 5 x 3 x 4 mm.  Prominent left parathyroid gland observed measuring approximately 7 x 3 x 3 mm.  IMPRESSION:  1.  Hypoechoic nodule of the inferior right thyroid gland measures 5 x 3 x 4 mm. Continued observation recommended.  2.  Prominent left parathyroid gland observed, could potentially reflect a parathyroid adenoma. May consider further evaluation with nuclear medicine parathyroid sestamibi scan if clinically necessary.    Lab Results   Component Value Date    PTH 66.6 05/01/2025    CALCIUM 9.4 05/01/2025    PHOS 3.6 05/01/2025     Lab Results   Component Value Date    ALBUMIN 4.4 05/01/2025       Daily intake of calcium is: None  Daily intake of vitamin D:  None    BMD:  None    Denies constipation, depression, polyuria, muscle aches or pains.  Reports possible kidney stone 5/2025- no imaging, etc just classic signs/ symptoms.   Reports prior fractures as a kid - arm and leg.   Denies history of renal disease.  Denies family history of hyperparathyroidism or calcium problems.  Denies HCTZ., lithium, or chlorthiadone use.      With regards to Pituitary Adenoma :    5/2015  MRI report 4 x 2 mm pituitary microadenoma     3/2016  MRI 4x2mm stable pituitary microadenoma    2/2017  MRI impression- stable appearance    2019  Functional Workup- prolactin, IGF1, TFTs unremarkable    2021  DMST unremarkable    2023  Prolactin normal     Reports regular menstrual cycles  "every 28 days that lasts for 5-7 days.    Not on OCPs.   Reports plans for fertility.     Denies headaches/ galactorrhea.     Review of Systems  As above        There were no vitals taken for this visit.      There is no height or weight on file to calculate BMI.    Lab Review:   No results found for: "HGBA1C"    Lab Results   Component Value Date    CHOL 184 08/06/2018    HDL 51 08/06/2018    LDLCALC 117 (H) 08/06/2018    TRIG 70 08/06/2018    CHOLHDL 3.6 08/06/2018     Lab Results   Component Value Date     05/01/2025    K 3.9 05/01/2025     05/01/2025    CO2 29 05/01/2025    GLU 59 (LL) 05/01/2025    BUN 19 05/01/2025    CREATININE 0.7 05/01/2025    CALCIUM 9.4 05/01/2025    PROT 6.9 02/24/2023    ALBUMIN 4.4 05/01/2025    BILITOT 0.5 02/24/2023    ALKPHOS 72 02/24/2023    AST 35 02/24/2023    ALT 65 (H) 02/24/2023    ANIONGAP 6 (L) 05/01/2025    ESTGFRAFRICA >60.0 09/23/2021    EGFRNONAA >60.0 09/23/2021    TSH 1.856 05/01/2025     Vitamin D   Date Value Ref Range Status   05/01/2025 35 30 - 96 ng/mL Final     Comment:     Vitamin D deficiency.........<10 ng/mL                              Vitamin D insufficiency......10-29 ng/mL       Vitamin D sufficiency........> or equal to 30 ng/mL  Vitamin D toxicity............>100 ng/mL     Assessment and Plan     1. Hashimoto's disease  levothyroxine (SYNTHROID) 100 MCG tablet    US Thyroid      2. Parathyroid adenoma  US Thyroid      3. Pituitary microadenoma        4. Hypoglycemia            Hashimoto's disease  -- Reviewed labs - TSH is at goal.  -- Calcium and iron need to be  from thyroid hormone replacement by 4 or > hours.  -- Please note: if you are taking biotin please hold it for 5 days prior to labs as it can interfere with the thyroid testing.  -- Medication Changes:   Levothyroxine 100mcg 1 tablet daily   -- Clinically and biochemically euthyroid.  -- Goal is a TSH <2.5.  -- Avoid exogenous hyperthyroidism as this can accelerate bone " loss and increase risk of CV complications.  -- Advised to take LT4 on an empty stomach with water and to wait 30-45 minutes before eating or taking other medications   -- Reviewed usual times of thyroid hormone changes  -- Reviewed that symptoms of hypothyroidism may not correlate with tsh, and a normal TSH is the goal of therapy. Symptoms are not a justification for over treatment.  -- Reviewed that the patient must call if she starts/stops OCPs or becomes pregnant because of the probable required change in LT4 dosage that will occur.     Parathyroid adenoma  -- Thyroid US 2/2024 noted possible parathyroid adenoma. Repeat imaging now.  -- PTH, Calcium, Phos and vitamin D normal.  -- Consider 24 hour urine ca/cr.    Pituitary microadenoma  -- Pituitary microadenoma stable on imaging - 2015, 2016, 2017.  -- Functional Workup 2019 -  prolactin, IGF1, TFTs, DMST unremarkable.  -- Will repeat if there is a clinical change.     Hypoglycemia  -- Hypoglycemia x1. Denied signs/symptoms. Denies history of hypoglycemia or DM. Could have been an assay error.  -- Discussed signs/symptoms to reach out.     Follow up in about 1 year (around 5/8/2026).      I spent 20 minutes face-to-face with the patient, over half of the visit was spent on counseling and/or coordinating the care of the patient.    Counseling includes:  Diagnostic results, impressions, recommendations   Prognosis   Risk and benefits of management/treatment options   Instructions for management treatment and or follow-up   Importance of compliance with management   Risk factor reduction   Patient education    Visit today included increased complexity associated with the care of the problems addressed and managing the longitudinal care of the patient due to the serious and/or complex managed problems.

## 2025-05-08 ENCOUNTER — OFFICE VISIT (OUTPATIENT)
Dept: ENDOCRINOLOGY | Facility: CLINIC | Age: 30
End: 2025-05-08
Payer: COMMERCIAL

## 2025-05-08 DIAGNOSIS — E06.3 HASHIMOTO'S DISEASE: Primary | ICD-10-CM

## 2025-05-08 DIAGNOSIS — D35.2 PITUITARY MICROADENOMA: ICD-10-CM

## 2025-05-08 DIAGNOSIS — D35.1 PARATHYROID ADENOMA: ICD-10-CM

## 2025-05-08 DIAGNOSIS — E16.2 HYPOGLYCEMIA: ICD-10-CM

## 2025-05-08 PROBLEM — E55.9 VITAMIN D DEFICIENCY: Status: RESOLVED | Noted: 2023-02-23 | Resolved: 2025-05-08

## 2025-05-08 PROCEDURE — 99212 OFFICE O/P EST SF 10 MIN: CPT | Performed by: NURSE PRACTITIONER

## 2025-05-08 PROCEDURE — G2211 COMPLEX E/M VISIT ADD ON: HCPCS | Mod: 95,,, | Performed by: NURSE PRACTITIONER

## 2025-05-08 PROCEDURE — 1159F MED LIST DOCD IN RCRD: CPT | Mod: CPTII,95,, | Performed by: NURSE PRACTITIONER

## 2025-05-08 PROCEDURE — 98006 SYNCH AUDIO-VIDEO EST MOD 30: CPT | Mod: 95,,, | Performed by: NURSE PRACTITIONER

## 2025-05-08 PROCEDURE — 1160F RVW MEDS BY RX/DR IN RCRD: CPT | Mod: CPTII,95,, | Performed by: NURSE PRACTITIONER

## 2025-05-08 RX ORDER — LEVOTHYROXINE SODIUM 100 UG/1
100 TABLET ORAL
Qty: 90 TABLET | Refills: 3 | Status: SHIPPED | OUTPATIENT
Start: 2025-05-08

## 2025-05-08 NOTE — ASSESSMENT & PLAN NOTE
-- Hypoglycemia x1. Denied signs/symptoms. Denies history of hypoglycemia or DM. Could have been an assay error.  -- Discussed signs/symptoms to reach out.

## 2025-05-08 NOTE — ASSESSMENT & PLAN NOTE
-- Pituitary microadenoma stable on imaging - 2015, 2016, 2017.  -- Functional Workup 2019 -  prolactin, IGF1, TFTs, DMST unremarkable.  -- Will repeat if there is a clinical change.

## 2025-05-08 NOTE — ASSESSMENT & PLAN NOTE
-- Reviewed labs - TSH is at goal.  -- Calcium and iron need to be  from thyroid hormone replacement by 4 or > hours.  -- Please note: if you are taking biotin please hold it for 5 days prior to labs as it can interfere with the thyroid testing.  -- Medication Changes:   Levothyroxine 100mcg 1 tablet daily   -- Clinically and biochemically euthyroid.  -- Goal is a TSH <2.5.  -- Avoid exogenous hyperthyroidism as this can accelerate bone loss and increase risk of CV complications.  -- Advised to take LT4 on an empty stomach with water and to wait 30-45 minutes before eating or taking other medications   -- Reviewed usual times of thyroid hormone changes  -- Reviewed that symptoms of hypothyroidism may not correlate with tsh, and a normal TSH is the goal of therapy. Symptoms are not a justification for over treatment.  -- Reviewed that the patient must call if she starts/stops OCPs or becomes pregnant because of the probable required change in LT4 dosage that will occur.

## 2025-05-08 NOTE — ASSESSMENT & PLAN NOTE
-- Thyroid US 2/2024 noted possible parathyroid adenoma. Repeat imaging now.  -- PTH, Calcium, Phos and vitamin D normal.  -- Consider 24 hour urine ca/cr.

## 2025-07-18 ENCOUNTER — HOSPITAL ENCOUNTER (OUTPATIENT)
Dept: ENDOCRINOLOGY | Facility: CLINIC | Age: 30
Discharge: HOME | End: 2025-07-18
Attending: NURSE PRACTITIONER
Payer: COMMERCIAL

## 2025-07-18 DIAGNOSIS — E06.3 HASHIMOTO'S DISEASE: ICD-10-CM

## 2025-07-18 DIAGNOSIS — D35.1 PARATHYROID ADENOMA: ICD-10-CM

## 2025-07-18 PROCEDURE — 76536 US EXAM OF HEAD AND NECK: CPT | Mod: S$GLB,,, | Performed by: INTERNAL MEDICINE
